# Patient Record
Sex: MALE | Race: OTHER | HISPANIC OR LATINO | Employment: OTHER | URBAN - METROPOLITAN AREA
[De-identification: names, ages, dates, MRNs, and addresses within clinical notes are randomized per-mention and may not be internally consistent; named-entity substitution may affect disease eponyms.]

---

## 2017-08-14 ENCOUNTER — HOSPITAL ENCOUNTER (EMERGENCY)
Facility: HOSPITAL | Age: 37
Discharge: HOME | End: 2017-08-14
Attending: EMERGENCY MEDICINE | Admitting: EMERGENCY MEDICINE
Payer: MEDICARE

## 2017-08-14 VITALS
RESPIRATION RATE: 18 BRPM | SYSTOLIC BLOOD PRESSURE: 116 MMHG | HEIGHT: 71 IN | TEMPERATURE: 98.1 F | OXYGEN SATURATION: 96 % | BODY MASS INDEX: 39.62 KG/M2 | DIASTOLIC BLOOD PRESSURE: 74 MMHG | WEIGHT: 283 LBS | HEART RATE: 71 BPM

## 2017-08-14 DIAGNOSIS — L70.8 ACNEIFORM ERUPTION: Primary | ICD-10-CM

## 2017-08-14 PROCEDURE — 99283 EMERGENCY DEPT VISIT LOW MDM: CPT

## 2017-08-14 RX ORDER — DOXYCYCLINE HYCLATE 100 MG/1
100 CAPSULE ORAL 2 TIMES DAILY
Qty: 20 CAPSULE | Refills: 0 | Status: SHIPPED | OUTPATIENT
Start: 2017-08-14 | End: 2017-08-24

## 2017-08-23 ENCOUNTER — APPOINTMENT (OUTPATIENT)
Dept: LAB | Facility: CLINIC | Age: 37
End: 2017-08-23
Payer: MEDICARE

## 2017-08-23 ENCOUNTER — ALLSCRIPTS OFFICE VISIT (OUTPATIENT)
Dept: OTHER | Facility: OTHER | Age: 37
End: 2017-08-23

## 2017-08-23 DIAGNOSIS — I82.492 ACUTE EMBOLISM AND THROMBOSIS OF OTHER SPECIFIED DEEP VEIN OF LEFT LOWER EXTREMITY (HCC): ICD-10-CM

## 2017-08-23 DIAGNOSIS — E66.01 MORBID (SEVERE) OBESITY DUE TO EXCESS CALORIES (HCC): ICD-10-CM

## 2017-08-23 DIAGNOSIS — R21 RASH AND OTHER NONSPECIFIC SKIN ERUPTION: ICD-10-CM

## 2017-08-23 DIAGNOSIS — R63.1 POLYDIPSIA: ICD-10-CM

## 2017-08-23 DIAGNOSIS — M25.572 PAIN IN LEFT ANKLE: ICD-10-CM

## 2017-08-23 DIAGNOSIS — L81.8 OTHER SPECIFIED DISORDERS OF PIGMENTATION: ICD-10-CM

## 2017-08-23 DIAGNOSIS — F41.8 OTHER SPECIFIED ANXIETY DISORDERS: ICD-10-CM

## 2017-08-23 DIAGNOSIS — J45.20 MILD INTERMITTENT ASTHMA, UNCOMPLICATED: ICD-10-CM

## 2017-08-23 DIAGNOSIS — R35.0 FREQUENCY OF MICTURITION: ICD-10-CM

## 2017-08-23 LAB
25(OH)D3 SERPL-MCNC: 15.1 NG/ML (ref 30–100)
ALBUMIN SERPL BCP-MCNC: 3.8 G/DL (ref 3.5–5)
ALP SERPL-CCNC: 85 U/L (ref 46–116)
ALT SERPL W P-5'-P-CCNC: 40 U/L (ref 12–78)
ANION GAP SERPL CALCULATED.3IONS-SCNC: 5 MMOL/L (ref 4–13)
APTT PPP: 30 SECONDS (ref 23–35)
AST SERPL W P-5'-P-CCNC: 20 U/L (ref 5–45)
BACTERIA UR QL AUTO: ABNORMAL /HPF
BASOPHILS # BLD AUTO: 0.05 THOUSANDS/ΜL (ref 0–0.1)
BASOPHILS NFR BLD AUTO: 1 % (ref 0–1)
BILIRUB SERPL-MCNC: 0.31 MG/DL (ref 0.2–1)
BILIRUB UR QL STRIP: NEGATIVE
BUN SERPL-MCNC: 13 MG/DL (ref 5–25)
CALCIUM SERPL-MCNC: 9.2 MG/DL (ref 8.3–10.1)
CAOX CRY URNS QL MICRO: ABNORMAL /HPF
CHLORIDE SERPL-SCNC: 106 MMOL/L (ref 100–108)
CHOLEST SERPL-MCNC: 147 MG/DL (ref 50–200)
CLARITY UR: CLEAR
CO2 SERPL-SCNC: 27 MMOL/L (ref 21–32)
COLOR UR: YELLOW
CREAT SERPL-MCNC: 0.95 MG/DL (ref 0.6–1.3)
EOSINOPHIL # BLD AUTO: 0.22 THOUSAND/ΜL (ref 0–0.61)
EOSINOPHIL NFR BLD AUTO: 3 % (ref 0–6)
ERYTHROCYTE [DISTWIDTH] IN BLOOD BY AUTOMATED COUNT: 14.3 % (ref 11.6–15.1)
EST. AVERAGE GLUCOSE BLD GHB EST-MCNC: 108 MG/DL
GFR SERPL CREATININE-BSD FRML MDRD: 102 ML/MIN/1.73SQ M
GLUCOSE P FAST SERPL-MCNC: 85 MG/DL (ref 65–99)
GLUCOSE UR STRIP-MCNC: NEGATIVE MG/DL
HBA1C MFR BLD: 5.4 % (ref 4.2–6.3)
HCT VFR BLD AUTO: 45.5 % (ref 36.5–49.3)
HDLC SERPL-MCNC: 39 MG/DL (ref 40–60)
HGB BLD-MCNC: 15.1 G/DL (ref 12–17)
HGB UR QL STRIP.AUTO: NEGATIVE
KETONES UR STRIP-MCNC: NEGATIVE MG/DL
LDLC SERPL CALC-MCNC: 78 MG/DL (ref 0–100)
LEUKOCYTE ESTERASE UR QL STRIP: NEGATIVE
LYMPHOCYTES # BLD AUTO: 1.69 THOUSANDS/ΜL (ref 0.6–4.47)
LYMPHOCYTES NFR BLD AUTO: 20 % (ref 14–44)
MCH RBC QN AUTO: 31.3 PG (ref 26.8–34.3)
MCHC RBC AUTO-ENTMCNC: 33.2 G/DL (ref 31.4–37.4)
MCV RBC AUTO: 94 FL (ref 82–98)
MONOCYTES # BLD AUTO: 0.9 THOUSAND/ΜL (ref 0.17–1.22)
MONOCYTES NFR BLD AUTO: 10 % (ref 4–12)
NEUTROPHILS # BLD AUTO: 5.78 THOUSANDS/ΜL (ref 1.85–7.62)
NEUTS SEG NFR BLD AUTO: 66 % (ref 43–75)
NITRITE UR QL STRIP: NEGATIVE
NON-SQ EPI CELLS URNS QL MICRO: ABNORMAL /HPF
NRBC BLD AUTO-RTO: 0 /100 WBCS
PH UR STRIP.AUTO: 6 [PH] (ref 4.5–8)
PLATELET # BLD AUTO: 302 THOUSANDS/UL (ref 149–390)
PMV BLD AUTO: 10.4 FL (ref 8.9–12.7)
POTASSIUM SERPL-SCNC: 4.1 MMOL/L (ref 3.5–5.3)
PROT SERPL-MCNC: 7.8 G/DL (ref 6.4–8.2)
PROT UR STRIP-MCNC: ABNORMAL MG/DL
RBC # BLD AUTO: 4.83 MILLION/UL (ref 3.88–5.62)
RBC #/AREA URNS AUTO: ABNORMAL /HPF
SODIUM SERPL-SCNC: 138 MMOL/L (ref 136–145)
SP GR UR STRIP.AUTO: 1.03 (ref 1–1.03)
TRIGL SERPL-MCNC: 148 MG/DL
TSH SERPL DL<=0.05 MIU/L-ACNC: 1.12 UIU/ML (ref 0.36–3.74)
UROBILINOGEN UR QL STRIP.AUTO: 0.2 E.U./DL
WBC # BLD AUTO: 8.67 THOUSAND/UL (ref 4.31–10.16)
WBC #/AREA URNS AUTO: ABNORMAL /HPF

## 2017-08-23 PROCEDURE — 85025 COMPLETE CBC W/AUTO DIFF WBC: CPT

## 2017-08-23 PROCEDURE — 86003 ALLG SPEC IGE CRUDE XTRC EA: CPT

## 2017-08-23 PROCEDURE — 82785 ASSAY OF IGE: CPT

## 2017-08-23 PROCEDURE — 80061 LIPID PANEL: CPT

## 2017-08-23 PROCEDURE — 82306 VITAMIN D 25 HYDROXY: CPT

## 2017-08-23 PROCEDURE — 83036 HEMOGLOBIN GLYCOSYLATED A1C: CPT

## 2017-08-23 PROCEDURE — 80053 COMPREHEN METABOLIC PANEL: CPT

## 2017-08-23 PROCEDURE — 81001 URINALYSIS AUTO W/SCOPE: CPT

## 2017-08-23 PROCEDURE — 84443 ASSAY THYROID STIM HORMONE: CPT

## 2017-08-23 PROCEDURE — 85730 THROMBOPLASTIN TIME PARTIAL: CPT

## 2017-08-23 PROCEDURE — 36415 COLL VENOUS BLD VENIPUNCTURE: CPT

## 2017-08-23 PROCEDURE — 86146 BETA-2 GLYCOPROTEIN ANTIBODY: CPT

## 2017-08-24 LAB
A ALTERNATA IGE QN: <0.1 KUA/I
A FUMIGATUS IGE QN: <0.1 KUA/I
ALLERGEN COMMENT: ABNORMAL
BERMUDA GRASS IGE QN: <0.1 KUA/I
BOXELDER IGE QN: 0.2 KUA/I
C HERBARUM IGE QN: <0.1 KUA/I
CAT DANDER IGE QN: <0.1 KUA/I
CMN PIGWEED IGE QN: <0.1 KUA/I
COMMON RAGWEED IGE QN: <0.1 KUA/I
COTTONWOOD IGE QN: <0.1 KUA/I
D FARINAE IGE QN: <0.1 KUA/I
D PTERONYSS IGE QN: <0.1 KUA/I
DOG DANDER IGE QN: <0.1 KUA/I
LONDON PLANE IGE QN: 0.1 KUA/I
MOUSE URINE PROT IGE QN: <0.1 KUA/I
MT JUNIPER IGE QN: <0.1 KUA/I
MUGWORT IGE QN: <0.1 KUA/I
P NOTATUM IGE QN: <0.1 KUA/I
ROACH IGE QN: 0.17 KUA/I
SHEEP SORREL IGE QN: <0.1 KUA/I
SILVER BIRCH IGE QN: <0.1 KUA/I
TIMOTHY IGE QN: <0.1 KUA/I
TOTAL IGE SMQN RAST: 146 KU/L (ref 0–113)
WALNUT IGE QN: 0.16 KUA/I
WHITE ASH IGE QN: 0.16 KUA/I
WHITE ELM IGE QN: 0.22 KUA/I
WHITE MULBERRY IGE QN: <0.1 KUA/I
WHITE OAK IGE QN: <0.1 KUA/I

## 2017-08-25 ENCOUNTER — ALLSCRIPTS OFFICE VISIT (OUTPATIENT)
Dept: OTHER | Facility: OTHER | Age: 37
End: 2017-08-25

## 2017-08-25 LAB
B2 GLYCOPROT1 IGA SER-ACNC: <9 GPI IGA UNITS (ref 0–25)
B2 GLYCOPROT1 IGG SER-ACNC: <9 GPI IGG UNITS (ref 0–20)
B2 GLYCOPROT1 IGM SER-ACNC: <9 GPI IGM UNITS (ref 0–32)

## 2017-09-15 ENCOUNTER — HOSPITAL ENCOUNTER (EMERGENCY)
Facility: HOSPITAL | Age: 37
Discharge: HOME | End: 2017-09-15
Attending: EMERGENCY MEDICINE | Admitting: EMERGENCY MEDICINE
Payer: MEDICARE

## 2017-09-15 ENCOUNTER — APPOINTMENT (EMERGENCY)
Dept: RADIOLOGY | Facility: HOSPITAL | Age: 37
End: 2017-09-15
Payer: MEDICARE

## 2017-09-15 VITALS
TEMPERATURE: 98.7 F | HEART RATE: 52 BPM | RESPIRATION RATE: 18 BRPM | WEIGHT: 243 LBS | DIASTOLIC BLOOD PRESSURE: 71 MMHG | OXYGEN SATURATION: 100 % | SYSTOLIC BLOOD PRESSURE: 118 MMHG | BODY MASS INDEX: 33.89 KG/M2

## 2017-09-15 DIAGNOSIS — N20.1 URETERAL STONE: ICD-10-CM

## 2017-09-15 DIAGNOSIS — R31.9 HEMATURIA: ICD-10-CM

## 2017-09-15 DIAGNOSIS — N39.0 UTI (URINARY TRACT INFECTION): ICD-10-CM

## 2017-09-15 DIAGNOSIS — R10.9 FLANK PAIN: Primary | ICD-10-CM

## 2017-09-15 LAB
ANION GAP SERPL CALCULATED.3IONS-SCNC: 7 MMOL/L (ref 4–13)
BACTERIA UR QL AUTO: ABNORMAL /HPF
BASOPHILS # BLD AUTO: 0.05 THOUSANDS/ΜL (ref 0–0.1)
BASOPHILS NFR BLD AUTO: 1 % (ref 0–1)
BILIRUB UR QL STRIP: ABNORMAL
BUN SERPL-MCNC: 12 MG/DL (ref 5–25)
CALCIUM SERPL-MCNC: 9.7 MG/DL (ref 8.3–10.1)
CHLORIDE SERPL-SCNC: 106 MMOL/L (ref 100–108)
CLARITY UR: CLEAR
CLARITY, POC: CLEAR
CO2 SERPL-SCNC: 26 MMOL/L (ref 21–32)
COLOR UR: YELLOW
COLOR, POC: YELLOW
CREAT SERPL-MCNC: 0.97 MG/DL (ref 0.6–1.3)
EOSINOPHIL # BLD AUTO: 0.11 THOUSAND/ΜL (ref 0–0.61)
EOSINOPHIL NFR BLD AUTO: 1 % (ref 0–6)
ERYTHROCYTE [DISTWIDTH] IN BLOOD BY AUTOMATED COUNT: 14.4 % (ref 11.6–15.1)
GFR SERPL CREATININE-BSD FRML MDRD: 99 ML/MIN/1.73SQ M
GLUCOSE SERPL-MCNC: 86 MG/DL (ref 65–140)
GLUCOSE UR STRIP-MCNC: NEGATIVE MG/DL
HCT VFR BLD AUTO: 47 % (ref 36.5–49.3)
HGB BLD-MCNC: 15.8 G/DL (ref 12–17)
HGB UR QL STRIP.AUTO: ABNORMAL
HYALINE CASTS #/AREA URNS LPF: ABNORMAL /LPF
KETONES UR STRIP-MCNC: NEGATIVE MG/DL
LEUKOCYTE ESTERASE UR QL STRIP: NEGATIVE
LYMPHOCYTES # BLD AUTO: 1.74 THOUSANDS/ΜL (ref 0.6–4.47)
LYMPHOCYTES NFR BLD AUTO: 21 % (ref 14–44)
MCH RBC QN AUTO: 31.3 PG (ref 26.8–34.3)
MCHC RBC AUTO-ENTMCNC: 33.6 G/DL (ref 31.4–37.4)
MCV RBC AUTO: 93 FL (ref 82–98)
MONOCYTES # BLD AUTO: 0.61 THOUSAND/ΜL (ref 0.17–1.22)
MONOCYTES NFR BLD AUTO: 8 % (ref 4–12)
NEUTROPHILS # BLD AUTO: 5.61 THOUSANDS/ΜL (ref 1.85–7.62)
NEUTS SEG NFR BLD AUTO: 69 % (ref 43–75)
NITRITE UR QL STRIP: NEGATIVE
NON-SQ EPI CELLS URNS QL MICRO: ABNORMAL /HPF
NRBC BLD AUTO-RTO: 0 /100 WBCS
PH UR STRIP.AUTO: 6 [PH] (ref 4.5–8)
PLATELET # BLD AUTO: 315 THOUSANDS/UL (ref 149–390)
PMV BLD AUTO: 10.4 FL (ref 8.9–12.7)
POTASSIUM SERPL-SCNC: 3.9 MMOL/L (ref 3.5–5.3)
PROT UR STRIP-MCNC: NEGATIVE MG/DL
RBC # BLD AUTO: 5.04 MILLION/UL (ref 3.88–5.62)
RBC #/AREA URNS AUTO: ABNORMAL /HPF
SODIUM SERPL-SCNC: 139 MMOL/L (ref 136–145)
SP GR UR STRIP.AUTO: >=1.03 (ref 1–1.03)
UROBILINOGEN UR QL STRIP.AUTO: 0.2 E.U./DL
WBC # BLD AUTO: 8.15 THOUSAND/UL (ref 4.31–10.16)
WBC #/AREA URNS AUTO: ABNORMAL /HPF

## 2017-09-15 PROCEDURE — 96375 TX/PRO/DX INJ NEW DRUG ADDON: CPT

## 2017-09-15 PROCEDURE — 85025 COMPLETE CBC W/AUTO DIFF WBC: CPT | Performed by: EMERGENCY MEDICINE

## 2017-09-15 PROCEDURE — 80048 BASIC METABOLIC PNL TOTAL CA: CPT | Performed by: EMERGENCY MEDICINE

## 2017-09-15 PROCEDURE — 74176 CT ABD & PELVIS W/O CONTRAST: CPT

## 2017-09-15 PROCEDURE — 36415 COLL VENOUS BLD VENIPUNCTURE: CPT | Performed by: EMERGENCY MEDICINE

## 2017-09-15 PROCEDURE — 81002 URINALYSIS NONAUTO W/O SCOPE: CPT | Performed by: EMERGENCY MEDICINE

## 2017-09-15 PROCEDURE — 99284 EMERGENCY DEPT VISIT MOD MDM: CPT

## 2017-09-15 PROCEDURE — 81001 URINALYSIS AUTO W/SCOPE: CPT

## 2017-09-15 PROCEDURE — 96374 THER/PROPH/DIAG INJ IV PUSH: CPT

## 2017-09-15 PROCEDURE — 96361 HYDRATE IV INFUSION ADD-ON: CPT

## 2017-09-15 RX ORDER — QUETIAPINE FUMARATE 50 MG/1
50 TABLET, FILM COATED ORAL
COMMUNITY
End: 2021-09-14 | Stop reason: HOSPADM

## 2017-09-15 RX ORDER — ONDANSETRON 2 MG/ML
4 INJECTION INTRAMUSCULAR; INTRAVENOUS ONCE
Status: COMPLETED | OUTPATIENT
Start: 2017-09-15 | End: 2017-09-15

## 2017-09-15 RX ORDER — ASPIRIN 81 MG/1
81 TABLET, CHEWABLE ORAL DAILY
COMMUNITY
End: 2021-09-09

## 2017-09-15 RX ORDER — KETOROLAC TROMETHAMINE 30 MG/ML
15 INJECTION, SOLUTION INTRAMUSCULAR; INTRAVENOUS ONCE
Status: COMPLETED | OUTPATIENT
Start: 2017-09-15 | End: 2017-09-15

## 2017-09-15 RX ORDER — NITROFURANTOIN 25; 75 MG/1; MG/1
100 CAPSULE ORAL 2 TIMES DAILY
Qty: 10 CAPSULE | Refills: 0 | Status: SHIPPED | OUTPATIENT
Start: 2017-09-15 | End: 2017-09-20

## 2017-09-15 RX ADMIN — KETOROLAC TROMETHAMINE 15 MG: 30 INJECTION, SOLUTION INTRAMUSCULAR at 15:00

## 2017-09-15 RX ADMIN — SODIUM CHLORIDE 1000 ML: 0.9 INJECTION, SOLUTION INTRAVENOUS at 15:00

## 2017-09-15 RX ADMIN — ONDANSETRON 4 MG: 2 INJECTION INTRAMUSCULAR; INTRAVENOUS at 15:00

## 2017-09-18 ENCOUNTER — GENERIC CONVERSION - ENCOUNTER (OUTPATIENT)
Dept: OTHER | Facility: OTHER | Age: 37
End: 2017-09-18

## 2018-01-12 VITALS
DIASTOLIC BLOOD PRESSURE: 84 MMHG | TEMPERATURE: 97.8 F | HEART RATE: 92 BPM | SYSTOLIC BLOOD PRESSURE: 118 MMHG | BODY MASS INDEX: 35.99 KG/M2 | WEIGHT: 257.06 LBS | HEIGHT: 71 IN

## 2018-01-13 VITALS
WEIGHT: 254.41 LBS | HEART RATE: 88 BPM | TEMPERATURE: 98 F | SYSTOLIC BLOOD PRESSURE: 112 MMHG | DIASTOLIC BLOOD PRESSURE: 86 MMHG | HEIGHT: 71 IN | BODY MASS INDEX: 35.62 KG/M2

## 2018-01-22 VITALS
DIASTOLIC BLOOD PRESSURE: 80 MMHG | HEIGHT: 71 IN | WEIGHT: 255.29 LBS | BODY MASS INDEX: 35.74 KG/M2 | HEART RATE: 98 BPM | SYSTOLIC BLOOD PRESSURE: 112 MMHG | TEMPERATURE: 97.8 F

## 2021-09-09 ENCOUNTER — HOSPITAL ENCOUNTER (INPATIENT)
Facility: HOSPITAL | Age: 41
LOS: 5 days | Discharge: HOME | DRG: 750 | End: 2021-09-14
Attending: EMERGENCY MEDICINE | Admitting: STUDENT IN AN ORGANIZED HEALTH CARE EDUCATION/TRAINING PROGRAM
Payer: COMMERCIAL

## 2021-09-09 ENCOUNTER — APPOINTMENT (INPATIENT)
Dept: RADIOLOGY | Facility: HOSPITAL | Age: 41
DRG: 750 | End: 2021-09-09
Payer: COMMERCIAL

## 2021-09-09 DIAGNOSIS — F25.9 SCHIZOAFFECTIVE DISORDER, UNSPECIFIED TYPE (HCC): ICD-10-CM

## 2021-09-09 DIAGNOSIS — G40.909 SEIZURE DISORDER (HCC): ICD-10-CM

## 2021-09-09 DIAGNOSIS — Z00.8 MEDICAL CLEARANCE FOR PSYCHIATRIC ADMISSION: ICD-10-CM

## 2021-09-09 DIAGNOSIS — F41.9 ANXIETY: ICD-10-CM

## 2021-09-09 DIAGNOSIS — Z86.718 HISTORY OF DVT IN ADULTHOOD: ICD-10-CM

## 2021-09-09 DIAGNOSIS — R45.851 SUICIDAL IDEATION: Primary | ICD-10-CM

## 2021-09-09 PROBLEM — F17.210 TOBACCO DEPENDENCE DUE TO CIGARETTES: Status: ACTIVE | Noted: 2021-09-09

## 2021-09-09 PROBLEM — Z87.898 HISTORY OF SEIZURE: Status: ACTIVE | Noted: 2021-09-09

## 2021-09-09 PROBLEM — F39 MOOD DISORDER (HCC): Status: ACTIVE | Noted: 2021-09-09

## 2021-09-09 LAB
AMPHETAMINES SERPL QL SCN: NEGATIVE
BARBITURATES UR QL: NEGATIVE
BENZODIAZ UR QL: NEGATIVE
COCAINE UR QL: POSITIVE
ETHANOL EXG-MCNC: 0 MG/DL
METHADONE UR QL: NEGATIVE
OPIATES UR QL SCN: NEGATIVE
OXYCODONE+OXYMORPHONE UR QL SCN: NEGATIVE
PCP UR QL: NEGATIVE
SARS-COV-2 RNA RESP QL NAA+PROBE: NEGATIVE
THC UR QL: POSITIVE

## 2021-09-09 PROCEDURE — 99285 EMERGENCY DEPT VISIT HI MDM: CPT | Performed by: PHYSICIAN ASSISTANT

## 2021-09-09 PROCEDURE — 82075 ASSAY OF BREATH ETHANOL: CPT | Performed by: PHYSICIAN ASSISTANT

## 2021-09-09 PROCEDURE — 99285 EMERGENCY DEPT VISIT HI MDM: CPT

## 2021-09-09 PROCEDURE — 99254 IP/OBS CNSLTJ NEW/EST MOD 60: CPT | Performed by: STUDENT IN AN ORGANIZED HEALTH CARE EDUCATION/TRAINING PROGRAM

## 2021-09-09 PROCEDURE — U0003 INFECTIOUS AGENT DETECTION BY NUCLEIC ACID (DNA OR RNA); SEVERE ACUTE RESPIRATORY SYNDROME CORONAVIRUS 2 (SARS-COV-2) (CORONAVIRUS DISEASE [COVID-19]), AMPLIFIED PROBE TECHNIQUE, MAKING USE OF HIGH THROUGHPUT TECHNOLOGIES AS DESCRIBED BY CMS-2020-01-R: HCPCS | Performed by: PHYSICIAN ASSISTANT

## 2021-09-09 PROCEDURE — 80307 DRUG TEST PRSMV CHEM ANLYZR: CPT | Performed by: PHYSICIAN ASSISTANT

## 2021-09-09 PROCEDURE — 99252 IP/OBS CONSLTJ NEW/EST SF 35: CPT | Performed by: PHYSICIAN ASSISTANT

## 2021-09-09 PROCEDURE — U0005 INFEC AGEN DETEC AMPLI PROBE: HCPCS | Performed by: PHYSICIAN ASSISTANT

## 2021-09-09 RX ORDER — BENZTROPINE MESYLATE 1 MG/1
1 TABLET ORAL
Status: DISCONTINUED | OUTPATIENT
Start: 2021-09-09 | End: 2021-09-14 | Stop reason: HOSPADM

## 2021-09-09 RX ORDER — PROPRANOLOL HYDROCHLORIDE 10 MG/1
10 TABLET ORAL EVERY 8 HOURS PRN
Status: DISCONTINUED | OUTPATIENT
Start: 2021-09-09 | End: 2021-09-14 | Stop reason: HOSPADM

## 2021-09-09 RX ORDER — LORAZEPAM 1 MG/1
2 TABLET ORAL ONCE
Status: COMPLETED | OUTPATIENT
Start: 2021-09-09 | End: 2021-09-09

## 2021-09-09 RX ORDER — CLONAZEPAM 0.5 MG/1
0.5 TABLET ORAL 2 TIMES DAILY
Status: DISCONTINUED | OUTPATIENT
Start: 2021-09-09 | End: 2021-09-09

## 2021-09-09 RX ORDER — DIVALPROEX SODIUM 250 MG/1
250 TABLET, EXTENDED RELEASE ORAL DAILY
COMMUNITY
End: 2021-09-09

## 2021-09-09 RX ORDER — ACETAMINOPHEN 325 MG/1
650 TABLET ORAL EVERY 6 HOURS PRN
Status: DISCONTINUED | OUTPATIENT
Start: 2021-09-09 | End: 2021-09-14 | Stop reason: HOSPADM

## 2021-09-09 RX ORDER — ACETAMINOPHEN 325 MG/1
975 TABLET ORAL EVERY 8 HOURS PRN
Status: DISCONTINUED | OUTPATIENT
Start: 2021-09-09 | End: 2021-09-14 | Stop reason: HOSPADM

## 2021-09-09 RX ORDER — CLONAZEPAM 0.5 MG/1
0.5 TABLET ORAL DAILY
COMMUNITY
End: 2021-09-14 | Stop reason: HOSPADM

## 2021-09-09 RX ORDER — DIVALPROEX SODIUM 500 MG/1
200 TABLET, EXTENDED RELEASE ORAL DAILY
Status: ON HOLD | COMMUNITY
End: 2021-09-14 | Stop reason: SDUPTHER

## 2021-09-09 RX ORDER — OLANZAPINE 10 MG/1
10 TABLET ORAL
Status: DISCONTINUED | OUTPATIENT
Start: 2021-09-09 | End: 2021-09-14 | Stop reason: HOSPADM

## 2021-09-09 RX ORDER — OLANZAPINE 2.5 MG/1
2.5 TABLET ORAL
Status: DISCONTINUED | OUTPATIENT
Start: 2021-09-09 | End: 2021-09-14 | Stop reason: HOSPADM

## 2021-09-09 RX ORDER — OLANZAPINE 5 MG/1
5 TABLET ORAL
Status: DISCONTINUED | OUTPATIENT
Start: 2021-09-09 | End: 2021-09-14 | Stop reason: HOSPADM

## 2021-09-09 RX ORDER — OLANZAPINE 10 MG/1
10 INJECTION, POWDER, LYOPHILIZED, FOR SOLUTION INTRAMUSCULAR
Status: DISCONTINUED | OUTPATIENT
Start: 2021-09-09 | End: 2021-09-14 | Stop reason: HOSPADM

## 2021-09-09 RX ORDER — DIVALPROEX SODIUM 500 MG/1
500 TABLET, EXTENDED RELEASE ORAL DAILY
Status: DISCONTINUED | OUTPATIENT
Start: 2021-09-09 | End: 2021-09-10

## 2021-09-09 RX ORDER — ALBUTEROL SULFATE 90 UG/1
2 AEROSOL, METERED RESPIRATORY (INHALATION) EVERY 4 HOURS PRN
Status: DISCONTINUED | OUTPATIENT
Start: 2021-09-09 | End: 2021-09-14 | Stop reason: HOSPADM

## 2021-09-09 RX ORDER — QUETIAPINE FUMARATE 50 MG/1
50 TABLET, FILM COATED ORAL 2 TIMES DAILY
Status: DISCONTINUED | OUTPATIENT
Start: 2021-09-09 | End: 2021-09-09

## 2021-09-09 RX ORDER — BENZTROPINE MESYLATE 1 MG/ML
1 INJECTION INTRAMUSCULAR; INTRAVENOUS
Status: DISCONTINUED | OUTPATIENT
Start: 2021-09-09 | End: 2021-09-14 | Stop reason: HOSPADM

## 2021-09-09 RX ORDER — QUETIAPINE FUMARATE 25 MG/1
25 TABLET, FILM COATED ORAL DAILY
COMMUNITY
End: 2021-09-14 | Stop reason: HOSPADM

## 2021-09-09 RX ORDER — OLANZAPINE 10 MG/1
5 INJECTION, POWDER, LYOPHILIZED, FOR SOLUTION INTRAMUSCULAR
Status: DISCONTINUED | OUTPATIENT
Start: 2021-09-09 | End: 2021-09-14 | Stop reason: HOSPADM

## 2021-09-09 RX ADMIN — RIVAROXABAN 20 MG: 20 TABLET, FILM COATED ORAL at 15:38

## 2021-09-09 RX ADMIN — DIVALPROEX SODIUM 500 MG: 500 TABLET, EXTENDED RELEASE ORAL at 15:38

## 2021-09-09 RX ADMIN — CLONAZEPAM 0.5 MG: 0.5 TABLET ORAL at 15:38

## 2021-09-09 RX ADMIN — LORAZEPAM 2 MG: 0.5 TABLET ORAL at 04:53

## 2021-09-09 NOTE — ED NOTES
Reviewed home med list with patient   Pt reports only taking medication for seizures (depakote) and Xarelto (Hx of leg DVTs) yesterday; all other medications have not been taken in "some time "      Jaci Chavira RN  09/09/21 0190

## 2021-09-09 NOTE — ED PROVIDER NOTES
History  Chief Complaint   Patient presents with    Psychiatric Evaluation     Patient presents for an evaluation of SI x1 day  Patient had a plan to hang himself and was apparently in the process of doing so when his uncle stopped him and drove patient to the hospital  States he has become more depressed because his wife is with his brother and he cannot see his children  Denies any HI, AH/ VH  He is unsure of his psychiatric diagnosis and cannot remember what medications he should be on (per chart review 50mg Seroquel, Valium, ASA)  No other complaints  Prior to Admission Medications   Prescriptions Last Dose Informant Patient Reported? Taking? QUEtiapine (SEROquel) 25 mg tablet Unknown at Unknown time Self Yes No   Sig: Take 25 mg by mouth daily Takes daily in AM   QUEtiapine (SEROquel) 50 mg tablet Unknown at Unknown time  Yes No   Sig: Take 50 mg by mouth daily at bedtime Takes daily at HS: in addition to AM dose   clonazePAM (KlonoPIN) 0 5 mg tablet Unknown at Unknown time Self Yes No   Sig: Take 0 5 mg by mouth daily   divalproex sodium (Depakote ER) 500 mg 24 hr tablet 9/8/2021 at Unknown time Self Yes Yes   Sig: Take 200 mg by mouth daily   rivaroxaban (Xarelto) 20 mg tablet 9/8/2021 at Unknown time Self Yes Yes   Sig: Take 20 mg by mouth      Facility-Administered Medications: None       Past Medical History:   Diagnosis Date    Depression     DVT, lower extremity ([facility] [address] )     Psychiatric disorder        History reviewed  No pertinent surgical history  History reviewed  No pertinent family history  I have reviewed and agree with the history as documented      E-Cigarette/Vaping    E-Cigarette Use Never User      E-Cigarette/Vaping Substances     Social History     Tobacco Use    Smoking status: Current Every Day Smoker     Packs/day: 1 50    Smokeless tobacco: Never Used   Vaping Use    Vaping Use: Never used   Substance Use Topics    Alcohol use: No    Drug use: Not Currently Types: Marijuana       Review of Systems   Constitutional: Negative for chills and fever  HENT: Negative for congestion, ear pain and sore throat  Eyes: Negative for pain  Respiratory: Negative for cough and shortness of breath  Cardiovascular: Negative for chest pain  Gastrointestinal: Negative for abdominal pain, nausea and vomiting  Genitourinary: Negative for dysuria  Musculoskeletal: Negative for back pain  Skin: Negative for rash  Neurological: Negative for dizziness, weakness and numbness  Psychiatric/Behavioral: Positive for dysphoric mood and suicidal ideas  Negative for agitation, behavioral problems, confusion, decreased concentration and self-injury  The patient is not nervous/anxious  All other systems reviewed and are negative  Physical Exam  Physical Exam  Vitals reviewed  Constitutional:       General: He is not in acute distress  Appearance: He is well-developed  He is not diaphoretic  HENT:      Head: Normocephalic and atraumatic  Right Ear: External ear normal       Left Ear: External ear normal       Nose: Nose normal    Eyes:      Pupils: Pupils are equal, round, and reactive to light  Cardiovascular:      Rate and Rhythm: Normal rate and regular rhythm  Heart sounds: Normal heart sounds  Pulmonary:      Effort: Pulmonary effort is normal       Breath sounds: Normal breath sounds  Abdominal:      General: Bowel sounds are normal       Palpations: Abdomen is soft  Tenderness: There is no abdominal tenderness  Musculoskeletal:         General: Normal range of motion  Cervical back: Normal range of motion and neck supple  Skin:     General: Skin is warm and dry  Neurological:      Mental Status: He is alert and oriented to person, place, and time  Psychiatric:         Attention and Perception: Attention and perception normal          Mood and Affect: Mood is depressed  Affect is not angry or inappropriate           Speech: Speech normal          Behavior: Behavior normal  Behavior is cooperative  Thought Content: Thought content includes suicidal ideation  Thought content does not include homicidal ideation  Thought content includes suicidal plan  Thought content does not include homicidal plan  Vital Signs  ED Triage Vitals   Temperature Pulse Respirations Blood Pressure SpO2   09/09/21 0347 09/09/21 0347 09/09/21 0347 09/09/21 0347 09/09/21 0347   97 9 °F (36 6 °C) 95 18 126/82 98 %      Temp Source Heart Rate Source Patient Position - Orthostatic VS BP Location FiO2 (%)   09/09/21 0347 09/09/21 0347 09/09/21 0345 09/09/21 0347 --   Tympanic Monitor Lying Left arm       Pain Score       --                  Vitals:    09/09/21 0345 09/09/21 0347   BP:  126/82   Pulse:  95   Patient Position - Orthostatic VS: Lying Sitting         Visual Acuity      ED Medications  Medications   LORazepam (ATIVAN) tablet 2 mg (2 mg Oral Given 9/9/21 0453)       Diagnostic Studies  Results Reviewed     Procedure Component Value Units Date/Time    Novel Coronavirus (Covid-19),PCR [facility] - 2 hour stat [[id number]]  (Normal) Collected: 09/09/21 0406    Lab Status: Final result Specimen: Nares from Nasopharyngeal Swab Updated: 09/09/21 0510     SARS-CoV-2 Negative    Narrative: The specimen collection materials, transport medium, and/or testing methodology utilized in the production of these test results have been proven to be reliable in a limited validation with an abbreviated program under the Emergency Utilization Authorization provided by the FDA  Testing reported as "Presumptive positive" will be confirmed with secondary testing to ensure result accuracy  Clinical caution and judgement should be used with the interpretation of these results with consideration of the clinical impression and other laboratory testing    Testing reported as "Positive" or "Negative" has been proven to be accurate according to standard laboratory validation requirements  All testing is performed with control materials showing appropriate reactivity at standard intervals  Rapid drug screen, urine [[id number]]  (Abnormal) Collected: 09/09/21 0410    Lab Status: Final result Specimen: Urine, Clean Catch Updated: 09/09/21 0437     Amph/Meth UR Negative     Barbiturate Ur Negative     Benzodiazepine Urine Negative     Cocaine Urine Positive     Methadone Urine Negative     Opiate Urine Negative     PCP Ur Negative     THC Urine Positive     Oxycodone Urine Negative    Narrative:      Presumptive report  If requested, specimen will be sent to reference lab for confirmation  FOR MEDICAL PURPOSES ONLY  IF CONFIRMATION NEEDED PLEASE CONTACT THE LAB WITHIN 5 DAYS  Drug Screen Cutoff Levels:  AMPHETAMINE/METHAMPHETAMINES  1000 ng/mL  BARBITURATES     200 ng/mL  BENZODIAZEPINES     200 ng/mL  COCAINE      300 ng/mL  METHADONE      300 ng/mL  OPIATES      300 ng/mL  PHENCYCLIDINE     25 ng/mL  THC       50 ng/mL  OXYCODONE      100 ng/mL    POCT alcohol breath test [[id number]]  (Normal) Resulted: 09/09/21 0408    Lab Status: Final result Updated: 09/09/21 0408     EXTBreath Alcohol 0 00                 No orders to display              Procedures  Procedures         ED Course                             SBIRT 20yo+      Most Recent Value   SBIRT (23 yo +)   In order to provide better care to our patients, we are screening all of our patients for alcohol and drug use  Would it be okay to ask you these screening questions? Yes Filed at: 09/09/2021 0345   Initial Alcohol Screen: US AUDIT-C    1  How often do you have a drink containing alcohol? 1 Filed at: 09/09/2021 0345   2  How many drinks containing alcohol do you have on a typical day you are drinking? 1 Filed at: 09/09/2021 0345   3a  Male UNDER 65: How often do you have five or more drinks on one occasion? 1 Filed at: 09/09/2021 0345   3b  FEMALE Any Age, or MALE 65+:  How often do you have 4 or more drinks on one occassion? 0 Filed at: 09/09/2021 0345   Audit-C Score  3 Filed at: 09/09/2021 0345   JESSICA: How many times in the past year have you    Used an illegal drug or used a prescription medication for non-medical reasons? Once or Twice Filed at: 09/09/2021 0345   DAST-10: In the past 12 months      1  Have you used drugs other than those required for medical reasons? 0 Filed at: 09/09/2021 0345   2  Do you use more than one drug at a time? 0 Filed at: 09/09/2021 0345   3  Have you had medical problems as a result of your drug use (e g , memory loss, hepatitis, convulsions, bleeding, etc )? 0 Filed at: 09/09/2021 0345   4  Have you had "blackouts" or "flashbacks" as a result of drug use? YesNo  0 Filed at: 09/09/2021 0345   5  Do you ever feel bad or guilty about your drug use? 0 Filed at: 09/09/2021 0345   6  Does your spouse (or parent) ever complain about your involvement with drugs? 0 Filed at: 09/09/2021 0345   7  Have you neglected your family because of your use of drugs? 0 Filed at: 09/09/2021 0345   8  Have you engaged in illegal activities in order to obtain drugs? 0 Filed at: 09/09/2021 0345   9  Have you ever experienced withdrawal symptoms (felt sick) when you stopped taking drugs? 0 Filed at: 09/09/2021 0345   10  Are you always able to stop using drugs when you want to?  0 Filed at: 09/09/2021 0345   DAST-10 Score  0 Filed at: 09/09/2021 0345                    MDM    Disposition  Final diagnoses:   Suicidal ideation     Time reflects when diagnosis was documented in both MDM as applicable and the Disposition within this note     Time User Action Codes Description Comment    9/9/2021  5:40 AM Jd Parker Add [K77 058] Suicidal ideation       ED Disposition     None      Follow-up Information    None         Patient's Medications   Discharge Prescriptions    No medications on file     No discharge procedures on file      PDMP Review     None          ED Provider  Electronically Signed by           Micheal Jung PA-C  09/09/21 2102

## 2021-09-09 NOTE — ED NOTES
Pt resting in room, no signs of distress  1:1 monitoring in progress with sitter at door        Ofelia Moseley RN  09/09/21 2125

## 2021-09-09 NOTE — ED TRIAGE NOTES
Reports not taking his medication for the past 2-3 weeks and states since then has become increasingly depressed and now has suicidal ideations of hanging himself  He states no one would bring him to the hospital so he walked a mile here  He states his last suicide attempt was about a year and a half ago when he slit his arm

## 2021-09-09 NOTE — ED NOTES
Pt sleeping, unable to be assessed by crisis  Will defer behavioral assessment until pt awakens  1:1 continual monitoring in progress with ED tech at door        Laurent Blackburn RN  09/09/21 [address] [place], RN  09/09/21 2795
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
## 2021-09-09 NOTE — ED NOTES
Pt sleeping in room, no resp distress noted  1:1 continual observation in process with ER staff at door        Aida Cassidy, ARMANDO  09/09/21 2995

## 2021-09-09 NOTE — ED NOTES
[place] PROMISe indicates: Active  Recipient # [id number]  BH is not managed  [facility] recently managed by [employer]  Phone number: [phone]  Call placed to [employer]  Spoke with [facility] termed 8/31/2021

## 2021-09-09 NOTE — ED NOTES
Insurance   EVS (Eligibility Verification System) called - [phone]    Automated system indicates: active [place]    [employer]   [phone]

## 2021-09-09 NOTE — PLAN OF CARE
Problem: CARDIOVASCULAR - ADULT  Goal: Maintains optimal cardiac output and hemodynamic stability  Description: INTERVENTIONS:  - Monitor I/O, vital signs and rhythm  - Monitor for S/S and trends of decreased cardiac output  - Administer and titrate ordered vasoactive medications to optimize hemodynamic stability  - Assess quality of pulses, skin color and temperature  - Assess for signs of decreased coronary artery perfusion  - Instruct patient to report change in severity of symptoms  Outcome: Progressing     Problem: RESPIRATORY - ADULT  Goal: Achieves optimal ventilation and oxygenation  Description: INTERVENTIONS:  - Assess for changes in respiratory status  - Assess for changes in mentation and behavior  - Position to facilitate oxygenation and minimize respiratory effort  - Oxygen administered by appropriate delivery if ordered  - Initiate smoking cessation education as indicated  - Encourage broncho-pulmonary hygiene including cough, deep breathe, Incentive Spirometry  - Assess the need for suctioning and aspirate as needed  - Assess and instruct to report SOB or any respiratory difficulty  - Respiratory Therapy support as indicated  Outcome: Progressing

## 2021-09-09 NOTE — ED NOTES
Report given to RN on 3B, awaiting bed assignment  1:1 continual monitoring in place with ED tech at door        Yana Rodriguez RN  09/09/21 1714

## 2021-09-09 NOTE — ED NOTES
"HISTORY OF PRESENT ILLNESS:    Tess Keyes is a 44 Pt sleeping with no distress noted  1:1 continual observation with marciano at door        Elisa Seen, RN  09/09/21 3188 Vincent Paulino MD  Dept. Orthopedic Surgery  [facility]       Disclaimer: This note consists of symbols derived from keyboarding, dictation and/or voice recognition software. As a result, there may be errors in the script that have gone undetected. Please consider this when interpreting information found in this chart.

## 2021-09-09 NOTE — ED NOTES
Pt sleeping with no distress noted  1:1 continual observation in progress with tech at door        Lety Samayoa RN  09/09/21 7522

## 2021-09-09 NOTE — ED NOTES
Attempted to wake the patient for Crisis assessment  He acknowledged Crisis presence by saying hello, then fell back to sleep

## 2021-09-09 NOTE — PLAN OF CARE
Problem: SUBSTANCE USE/ABUSE  Goal: Will have no detox symptoms and will verbalize plan for changing substance-related behavior  Description: INTERVENTIONS:  - Monitor physical status and assess for symptoms of withdrawal  - Administer medication as ordered  - Provide emotional support with 1 on 1 interaction with staff  - Encourage recovery focused program/ addiction education  - Assess for verbalization of changing behaviors related to substance abuse  - Initiate consults and referrals as appropriate (Case Management, Spiritual Care, etc )  Outcome: Not Progressing  Goal: By discharge, will develop insight into their chemical dependency and sustain motivation to continue in recovery  Description: INTERVENTIONS:  - Attends all daily group sessions and scheduled AA groups  - Actively practices coping skills through participation in the therapeutic community and adherence to program rules  - Reviews and completes assignments from individual treatment plan  - Assist patient development of understanding of their personal cycle of addiction and relapse triggers  Outcome: Not Progressing     Problem: DISCHARGE PLANNING  Goal: Discharge to home or other facility with appropriate resources  Description: INTERVENTIONS:  - Identify barriers to discharge w/patient and caregiver  - Arrange for needed discharge resources and transportation as appropriate  - Identify discharge learning needs (meds, wound care, etc )  - Arrange for interpretive services to assist at discharge as needed  - Refer to Case Management Department for coordinating discharge planning if the patient needs post-hospital services based on physician/advanced practitioner order or complex needs related to functional status, cognitive ability, or social support system  Outcome: Not Progressing     Problem: METABOLIC, FLUID AND ELECTROLYTES - ADULT  Goal: Electrolytes maintained within normal limits  Description: INTERVENTIONS:  - Monitor labs and assess patient for signs and symptoms of electrolyte imbalances  - Administer electrolyte replacement as ordered  - Monitor response to electrolyte replacements, including repeat lab results as appropriate  - Instruct patient on fluid and nutrition as appropriate  Outcome: Not Progressing  Goal: Fluid balance maintained  Description: INTERVENTIONS:  - Monitor labs   - Monitor I/O and WT  - Instruct patient on fluid and nutrition as appropriate  - Assess for signs & symptoms of volume excess or deficit  Outcome: Not Progressing  Goal: Glucose maintained within target range  Description: INTERVENTIONS:  - Monitor Blood Glucose as ordered  - Assess for signs and symptoms of hyperglycemia and hypoglycemia  - Administer ordered medications to maintain glucose within target range  - Assess nutritional intake and initiate nutrition service referral as needed  Outcome: Not Progressing     Problem: Risk for Self Injury/Neglect  Goal: Treatment Goal: Remain safe during length of stay, learn and adopt new coping skills, and be free of self-injurious ideation, impulses and acts at the time of discharge  Outcome: Not Progressing  Goal: Verbalize thoughts and feelings  Description: Interventions:  - Assess and re-assess patient's lethality and potential for self-injury  - Engage patient in 1:1 interactions, daily, for a minimum of 15 minutes  - Encourage patient to express feelings, fears, frustrations, hopes  - Establish rapport/trust with patient   Outcome: Not Progressing  Goal: Refrain from harming self  Description: Interventions:  - Monitor patient closely, per order  - Develop a trusting relationship  - Supervise medication ingestion, monitor effects and side effects   Outcome: Not Progressing  Goal: Attend and participate in unit activities, including therapeutic, recreational, and educational groups  Description: Interventions:  - Provide therapeutic and educational activities daily, encourage attendance and participation, and document same in the medical record  - Obtain collateral information, encourage visitation and family involvement in care   Outcome: Not Progressing

## 2021-09-09 NOTE — ED NOTES
Pt sleeping with no distress, 1:1 continual observation at door        Ofelia Moseley, ARMANDO  09/09/21 0052

## 2021-09-09 NOTE — ED NOTES
Patient reports he was brought to the ED by his uncle after uncle observed the patient attempting to hang self  Patient presents as guarded with vague, inconsistent responses to questions  He stated he has been unable to see his children and his brother is now in a relationship with his wife  The patient reports a history of bipolar disorder "and a lot of things " EMR indicates a history of schizoaffective disorder, bipolar type, cocaine, PCP and marijuana use  There is a reported history of a seizure disorder and PE  Patient stated he has been off of his medication for several weeks  He has no current outpatient provider  In addition to depression and suicidal ideation, he reports poor sleep and appetite, poor energy,decreased motivAtion and anhedonia  Patient also reported the feeling like people are following him and talking negatively about him  He reported a previous history of suicide attempt by hanging but was unable to provide a time frame  According to the EMR, he has previously reported an overdose, stabbing his thigh with a screw  and cutting his wrist  He has also previously reported auditory hallucinations of a male voice telling him to come here  Patient stated he uses marijuana nearly daily  Reports cocaine use "here and there " There is a history of PCP use as well, per EMR  PAtient stated he has no supports at all and does not want to return to where he had been staying

## 2021-09-09 NOTE — ED CARE HANDOFF
Emergency Department Sign Out Note            ED Course / Workup Pending (followup): Based on evaluation patient is at risk for self-harm  Patient is willing to sign 201  Would need 302 commitment if unwilling to continue with 201  Diagnostics reviewed and patient is medically cleared for inpatient psychiatric admission   [facility] has been contacted and their evaluation is pending  Care of patient transferred to incoming team                                        Procedures  MDM    Disposition  Final diagnoses:   Suicidal ideation     Time reflects when diagnosis was documented in both MDM as applicable and the Disposition within this note     Time User Action Codes Description Comment    9/9/2021  5:40 AM Basilia Parker Add [Y51 [id number]] Suicidal ideation       ED Disposition     None      MD Documentation      Most Recent Value   Sending MD Thomas Aiken DO      Follow-up Information    None       Patient's Medications   Discharge Prescriptions    No medications on file     No discharge procedures on file         ED Provider  Electronically Signed by     Vida Newman DO  09/09/21 3884

## 2021-09-09 NOTE — ASSESSMENT & PLAN NOTE
· Patient originally presented to [facility] ED 09/09/2021 for evaluation of suicidal ideations  · Currently 201 status  · Management per Psychiatry

## 2021-09-09 NOTE — NURSING NOTE
Per ED  Patient reports he was brought to the ED by his uncle after uncle observed the patient attempting to hang self  Patient presents as guarded with vague, inconsistent responses to questions  He stated he has been unable to see his children and his brother is now in a relationship with his wife  The patient reports a history of bipolar disorder "and a lot of things " EMR indicates a history of schizoaffective disorder, bipolar type, cocaine, PCP and marijuana use  There is a reported history of a seizure disorder and PE  Patient stated he has been off of his medication for several weeks  He has no current outpatient provider  In addition to depression and suicidal ideation, he reports poor sleep and appetite, poor energy,decreased motivAtion and anhedonia  Patient also reported the feeling like people are following him and talking negatively about him  He reported a previous history of suicide attempt by hanging but was unable to provide a time frame  According to the EMR, he has previously reported an overdose, stabbing his thigh with a screw  and cutting his wrist  He has also previously reported auditory hallucinations of a male voice telling him to come here  Patient stated he uses marijuana nearly daily  Reports cocaine use "here and there " There is a history of PCP use as well, per EMR  PAtient stated he has no supports at all and does not want to return to where he had been staying  Per this writer  Pt is 39year old male 12 from [facility]  Pt speech is bizarre, disorganized and seems distracted  Laughing inappropriately  Reports having HI to brother and wife due to their affair  Denies SI/HI/AH/VH at this time and feels safe on the unit  Reports 2 suicide attempts in lifetime by hanging self and was interrupted both times  Pt reports previous psych admission 6 months ago at [facility]  Pt reports not taking any medications but depakote and xarelto  UDS+ for cocaine and THC   Medical Hx of PE,DVT and seizures (controlled)  Pt also reports thinking he had a microchip in R forearm and tried to dig it out with a butter knife a few years ago  Pt oriented to unit and cooperative with this writer

## 2021-09-10 ENCOUNTER — APPOINTMENT (INPATIENT)
Dept: RADIOLOGY | Facility: HOSPITAL | Age: 41
DRG: 750 | End: 2021-09-10
Payer: COMMERCIAL

## 2021-09-10 PROBLEM — F25.9 SCHIZOAFFECTIVE DISORDER (HCC): Status: ACTIVE | Noted: 2021-09-09

## 2021-09-10 LAB — VALPROATE SERPL-MCNC: 22.5 UG/ML (ref 50–120)

## 2021-09-10 PROCEDURE — 80164 ASSAY DIPROPYLACETIC ACD TOT: CPT | Performed by: PHYSICIAN ASSISTANT

## 2021-09-10 PROCEDURE — 99232 SBSQ HOSP IP/OBS MODERATE 35: CPT | Performed by: STUDENT IN AN ORGANIZED HEALTH CARE EDUCATION/TRAINING PROGRAM

## 2021-09-10 PROCEDURE — 73502 X-RAY EXAM HIP UNI 2-3 VIEWS: CPT

## 2021-09-10 RX ORDER — ARIPIPRAZOLE 5 MG/1
5 TABLET ORAL ONCE
Status: COMPLETED | OUTPATIENT
Start: 2021-09-10 | End: 2021-09-10

## 2021-09-10 RX ORDER — ARIPIPRAZOLE 10 MG/1
10 TABLET ORAL DAILY
Status: DISCONTINUED | OUTPATIENT
Start: 2021-09-11 | End: 2021-09-14 | Stop reason: HOSPADM

## 2021-09-10 RX ORDER — DIVALPROEX SODIUM 250 MG/1
250 TABLET, EXTENDED RELEASE ORAL 2 TIMES DAILY
Status: DISCONTINUED | OUTPATIENT
Start: 2021-09-11 | End: 2021-09-13

## 2021-09-10 RX ADMIN — NICOTINE POLACRILEX 2 MG: 2 GUM, CHEWING ORAL at 13:17

## 2021-09-10 RX ADMIN — ARIPIPRAZOLE 5 MG: 5 TABLET ORAL at 13:17

## 2021-09-10 RX ADMIN — DIVALPROEX SODIUM 500 MG: 500 TABLET, EXTENDED RELEASE ORAL at 08:26

## 2021-09-10 RX ADMIN — RIVAROXABAN 20 MG: 20 TABLET, FILM COATED ORAL at 18:01

## 2021-09-10 RX ADMIN — ACETAMINOPHEN 975 MG: 325 TABLET ORAL at 08:26

## 2021-09-10 NOTE — ASSESSMENT & PLAN NOTE
· Patient reports h/o asthma, uses albuterol as needed  · Most recent vitals stable, patient afebrile  · Continue to monitor vitals, symptoms  · Albuterol PRN

## 2021-09-10 NOTE — ASSESSMENT & PLAN NOTE
· Patient originally presented to [facility] ED 09/09/2021 for evaluation of suicidal ideations  · Currently 201 status  · Management per Psychiatry

## 2021-09-10 NOTE — CASE MANAGEMENT
Pt is a 39year old single male admitted 9/9/21 0347 as a 201 from [facility] ED  CW met with pt in order to complete initial intake/assessment and pt presents as cooperative  Pt reports 5 past IP psych admissions last a month ago in [facility]     Pt reports he was residing at [place] [id number] with his mom and family but that they recently moved to [facility]  Pt reports he would like to eventually go to [facility] with them but is interested in discharging to a shelter until then  Pt reports there is a shelter in [place] he would like to go to  Pt reports he will need transport upon disch  Pt reports correct cell number as [phone]  Pt denies OP [facility] provider but reports being open to a referral     Pt denies having an ICM but reports being open to a referral     Pt reports he is unsure if he has a PCP and that it has been many years since he has seen a doctor, resources for choosing a PCP listed in AVS      Pt reports primary supports as mom and sister, pt declined to sign TYRELL's for [facility], D&A, and CRISIS resources placed in AVS     Pt AUDIT 0, UDS+ THC and Cocaine, PAWSS 0  Pt denies drug or alcohol issues when CW asked about cocaine in UDS pt stated "3 days ago one of my friends told me it was weed but it had cocaine in it"  Transportation Pt reports he has transport through an agency due to his seizures    [place] [address] home? Y/N with who No, will need transport    Access to firearms Pt denies    Supports Sister, mom, family   Legal Pt denies    Education  9th grade, no GED   Employed? Y/N Where Unemployed   Income Source/How much SSD, 715 monthly      Pt denies stressors/barriers/triggers  Pt denies coping skills    Pt reports chief complaint as "I was going to hang myself"

## 2021-09-10 NOTE — NURSING NOTE
Pt visible on unit and social with select peers  Pt approached nurse requesting a room change due to issues with his roommate, discussed with pt that a room change will happen later in the day when discharges occur, pt was agreeable to this  Denies psych symptoms at this time  Irritable edge with pressured speech  Medication and meal compliant  Denies any unmet needs or complaints at this time  Will monitor

## 2021-09-10 NOTE — PLAN OF CARE
Met with patient, pt states "I am feeling well and would like to go to [place] to my girlfriend"  Pt states that his girlfriend is 7+ months pregnant and he must go home to cash Girlfriend's check to help  Pt denied having SI, HI and AVH  Pt initially wanted to go to recovery center in [place], [place] then contemplating about going girlfriend in in [place]  Encouraged compliance with meds and unit activities  Continue to monitor       Problem: SUBSTANCE USE/ABUSE  Goal: Will have no detox symptoms and will verbalize plan for changing substance-related behavior  Description: INTERVENTIONS:  - Monitor physical status and assess for symptoms of withdrawal  - Administer medication as ordered  - Provide emotional support with 1 on 1 interaction with staff  - Encourage recovery focused program/ addiction education  - Assess for verbalization of changing behaviors related to substance abuse  - Initiate consults and referrals as appropriate (Case Management, Spiritual Care, etc )  Outcome: Progressing  Goal: By discharge, will develop insight into their chemical dependency and sustain motivation to continue in recovery  Description: INTERVENTIONS:  - Attends all daily group sessions and scheduled AA groups  - Actively practices coping skills through participation in the therapeutic community and adherence to program rules  - Reviews and completes assignments from individual treatment plan  - Assist patient development of understanding of their personal cycle of addiction and relapse triggers  Outcome: Progressing     Problem: DISCHARGE PLANNING  Goal: Discharge to home or other facility with appropriate resources  Description: INTERVENTIONS:  - Identify barriers to discharge w/patient and caregiver  - Arrange for needed discharge resources and transportation as appropriate  - Identify discharge learning needs (meds, wound care, etc )  - Arrange for interpretive services to assist at discharge as needed  - Refer to Case Management Department for coordinating discharge planning if the patient needs post-hospital services based on physician/advanced practitioner order or complex needs related to functional status, cognitive ability, or social support system  Outcome: Progressing     Problem: METABOLIC, FLUID AND ELECTROLYTES - ADULT  Goal: Electrolytes maintained within normal limits  Description: INTERVENTIONS:  - Monitor labs and assess patient for signs and symptoms of electrolyte imbalances  - Administer electrolyte replacement as ordered  - Monitor response to electrolyte replacements, including repeat lab results as appropriate  - Instruct patient on fluid and nutrition as appropriate  Outcome: Progressing  Goal: Fluid balance maintained  Description: INTERVENTIONS:  - Monitor labs   - Monitor I/O and WT  - Instruct patient on fluid and nutrition as appropriate  - Assess for signs & symptoms of volume excess or deficit  Outcome: Progressing  Goal: Glucose maintained within target range  Description: INTERVENTIONS:  - Monitor Blood Glucose as ordered  - Assess for signs and symptoms of hyperglycemia and hypoglycemia  - Administer ordered medications to maintain glucose within target range  - Assess nutritional intake and initiate nutrition service referral as needed  Outcome: Progressing     Problem: Risk for Self Injury/Neglect  Goal: Verbalize thoughts and feelings  Description: Interventions:  - Assess and re-assess patient's lethality and potential for self-injury  - Engage patient in 1:1 interactions, daily, for a minimum of 15 minutes  - Encourage patient to express feelings, fears, frustrations, hopes  - Establish rapport/trust with patient   Outcome: Progressing  Goal: Refrain from harming self  Description: Interventions:  - Monitor patient closely, per order  - Develop a trusting relationship  - Supervise medication ingestion, monitor effects and side effects   Outcome: Progressing  Goal: Attend and participate in unit activities, including therapeutic, recreational, and educational groups  Description: Interventions:  - Provide therapeutic and educational activities daily, encourage attendance and participation, and document same in the medical record  - Obtain collateral information, encourage visitation and family involvement in care   Outcome: Progressing     Problem: CARDIOVASCULAR - ADULT  Goal: Maintains optimal cardiac output and hemodynamic stability  Description: INTERVENTIONS:  - Monitor I/O, vital signs and rhythm  - Monitor for S/S and trends of decreased cardiac output  - Administer and titrate ordered vasoactive medications to optimize hemodynamic stability  - Assess quality of pulses, skin color and temperature  - Assess for signs of decreased coronary artery perfusion  - Instruct patient to report change in severity of symptoms  Outcome: Progressing     Problem: RESPIRATORY - ADULT  Goal: Achieves optimal ventilation and oxygenation  Description: INTERVENTIONS:  - Assess for changes in respiratory status  - Assess for changes in mentation and behavior  - Position to facilitate oxygenation and minimize respiratory effort  - Oxygen administered by appropriate delivery if ordered  - Initiate smoking cessation education as indicated  - Encourage broncho-pulmonary hygiene including cough, deep breathe, Incentive Spirometry  - Assess the need for suctioning and aspirate as needed  - Assess and instruct to report SOB or any respiratory difficulty  - Respiratory Therapy support as indicated  Outcome: Progressing     Problem: Ineffective Coping  Goal: Participates in unit activities  Description: Interventions:  - Provide therapeutic environment   - Provide required programming   - Redirect inappropriate behaviors   Outcome: Progressing

## 2021-09-10 NOTE — ASSESSMENT & PLAN NOTE
· Patient with h/o multiple DVTs, PE  · Continue Xarelto 20 mg daily   · Recommend follow-up PCP outpatient for ongoing monitoring/maintenance
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
## 2021-09-10 NOTE — TREATMENT TEAM
09/10/21 0902   Team Meeting   Meeting Type Daily Rounds   Team Members Present   Team Members Present Physician;Nurse;; Other (Discipline and Name)   Physician Team Member Gerardo   [facility] Member [place] Florez   Other (Discipline and Name)    Patient/Family Present   Patient Present No   Patient's Family Present No     201, admitted due to suicidal ideation -attempt to hang himself  Readmit score 12  UDS positive for Cocaine and THC  Continue assessment  Continue to monitor

## 2021-09-10 NOTE — TREATMENT TEAM
09/10/21 1457   Team Meeting   Meeting Type Tx Team Meeting   Team Members Present   Team Members Present Physician;Nurse;   Physician Team Member Evert Bowden51 Team Member [facility] Management Team Member Jimena   Patient/Family Present   Patient Present Yes   Patient's Family Present No     Treatment team met, reviewed treatment goals, diagnosis, meds, and discharge plan  Pt is in agreement and signed treatment plan  A copy of the treatment plan is placed in patient's folder  Continue to monitor

## 2021-09-10 NOTE — NURSING NOTE
Assumed care of patient at 1900  Patient denies all symptoms  He was flat, appears depressed and has been withdrawn to room  Will continue to monitor

## 2021-09-10 NOTE — DISCHARGE INSTR - APPOINTMENTS
Melita Sheriff RN, our [employer], will be calling you after your discharge, on the phone number that you provided  She will be available as an additional support, if needed  If you wish to speak with her, you may contact Zoey Encinas at [phone]

## 2021-09-10 NOTE — TREATMENT PLAN
TREATMENT PLAN REVIEW - [address] 39 y o  1980 male MRN: [id number]    [address] [place] [address] Room / Bed: [place] [address] [address] Encounter: [id number]          Admit Date/Time:  9/9/2021  3:47 AM    Treatment Team: Attending Provider: Prem Crook MD; Licensed Practical Nurse: Sergei Flynn LPN; Registered Nurse: Marie Ruiz, RN; Care Manager: Olu Leach, RN; Registered Nurse: Sujata Mclain, ARMANDO; : Meggan Randle; Patient Care Assistant: Ruchi Blount; Resident: Byron Ryder DO; Patient Care Technician: Ned Anderson;  : Conchis Monaco; Registered Nurse: Reynaldo Parekh RN    Diagnosis: Principal Problem:    Mood disorder [facility])  Active Problems:    Medical clearance for psychiatric admission    Seizure disorder ([facility] )    History of DVT in adulthood    Tobacco dependence due to cigarettes    Substance abuse ([facility] )    Acute hip pain, left    Mild intermittent asthma without complication      Patient Strengths/Assets: compliant with medication, negotiates basic needs, patient is on a voluntary commitment, patient is willing to work on problems    Patient Barriers/Limitations: difficulty adapting, lack of stable employment, poor insight, poor support system    Short Term Goals: decrease in depressive symptoms, decrease in anxiety symptoms, decrease in suicidal thoughts, ability to stay safe on the unit, improvement in insight, improvement in reasoning ability, improvement in self care, mood stabilization    Long Term Goals: improvement in depression, improvement in anxiety, stabilization of mood, free of suicidal thoughts, free of homicidal thoughts, no self abusive behavior, improvement in reasoning ability, improved insight, adequate sleep, appropriate interaction with peers    Progress Towards Goals: starting psychiatric medications as prescribed    Recommended Treatment: medication management, patient medication education, group therapy, milieu therapy, continued Behavioral Health psychiatric evaluation/assessment process    Treatment Frequency: daily medication monitoring, group and milieu therapy daily, monitoring through interdisciplinary rounds, monitoring through weekly patient care conferences    Expected Discharge Date:  9/17/21    Discharge Plan: discharge to home    Treatment Plan Created/Updated By: Marissa Pierre MD
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
## 2021-09-10 NOTE — DISCHARGE INSTR - OTHER ORDERS
You will be discharged to with and at [facility] at [address] [place], [place]    Your meds will be filled at [facility] and provided to you prior to discharge  After discharge, if you find your coping skills are not as effective and you continue to feel distressed please call Chan Arciniega services are available 24 hours a day by calling [facility] ([facility]) AT [place] at [phone], and [address] : [phone]    Crisis Text Line : 424015     If you feel you are a danger to yourself or others please contact 911 or go to nearest Emergency room to seek immediate help  [address]  [address] [place], [place]  Phone # [phone] or [phone]  Crisis Intervention is the 24 hour emergency mental health service provided by the [address] [place] [address]/Intellectual Disabilities Program  Crisis Intervention staff provide supportive counseling, outreach, assessment, and referral information to individuals experiencing an emotional crisis or difficulty in coping with a personal problem     Crisis staff works to provide an immediate response to people experiencing a mental health emergency by:   De-escalating and supporting an individual or family in a crisis   Serve as a liaison to police, emergency departments, mental health facilities, and providers   Provide risk assessments and psychosocial assessment   Provide brief follow-up and support to individuals and families after a crisis   Provide information and referrals to individuals and families   Identify alternatives to hospitalization when possible, and facilitate voluntary or involuntary hospitalization when appropriate   Provide mental health disaster responses to the community, including first responders   Provide consultation and mental health training to community agencies  Crisis Intervention provides telephone, walk-in and mobile services, which are all available 24-hours a day through the Crisis Intervention Office  Telephone Crisis or hot line services consist of telephone counseling, consultation and referral for individuals who exhibit an acute problem of mood or thought disturbance, or emotional distress that may include behavior or social relationships  Walk-in Crisis services are face-to-face contacts with individuals in crisis or with individuals seeking help for persons in crisis  Services include assessment, information and referral, crisis counseling, crisis resolution, and accessing community resources   Mobile Crisis services are individually and team delivered crisis intervention services that are provided at a community site where the crisis is occurring or a place where a person in crisis is located, such as the person's home or school  Mobile Crisis services include assessment, counseling, crisis resolution, referral and follow-up  Augustus Chapaalex is a confidential 7 days/week telephone support service manned by trained mental health consumers  [employer] operates daily but is not able to accept calls between 2AM-6AM  [employer] provides support, a listening ear and can provide information about available services  [employer] specializes in the concerns of mental health consumers, their families and friends  However, we are also here for anyone who has a mental health concern, is confused about or just doesn't know anything about mental health or where to get information  To reach Augustus Mccrary, call [phone] accepts calls between 6:00 AM to 10:00 AM and from 4:00 PM to 12:00 AM     Text CONNECT to 473717 from anywhere in the [place], anytime, about any type of crisis  A live, trained Crisis Counselor receives the text and lets you know that they are here to listen  The volunteer Crisis Counselor will help you move from a hot moment to a cool moment        The [employer] on Mental Illness ([facility]) offers various education & support groups for you & your family  For more information visit their website at http://www Loop/     Dial 2-1-1 to get connected/get help  Free, confidential information & referral available 24/7: Aging Services, Child & Youth Services, Counseling, Education/Training, Food/Shelter/Clothing, Health Services, Parenting, Substance Abuse, Support Groups, Volunteer Opportunities, & much more  Phone: 2-1-1 or [phone], Web: STANLEY DO991MQAS [facility], Email: Awilda@yahoo com    The National Suicide Prevention Lifeline, 0-731-528-RNRT (4662), is a federally funded, 24-hour, toll-free suicide prevention service comprised of more than 120 individual crisis centers across the country  This service is available to anyone in suicidal crisis, emotional distress or those concerned about a friend or loved one  Https://suicidepreventionlifeline org/    Find more resources at [address] Drug and Alcohol Resources     Drug & Alcohol Services  [address] [place] [address]  Phone: [phone]   Fax: [phone]   Hours: Monday - Friday, 8:00 am - 4:30 pm    Request for Evaluation  [facility] ([facility]) residents can seek drug and alcohol services in several ways  One way is to contact our office, [address] [place], [place] [address]vd and Alcohol Services, at [phone]  We are located at [address] [place] [address]  Residents may also contact one of the licensed drug and alcohol outpatient providers that we contract with listed below  Both options will put you in touch with a drug and alcohol  who will start by asking you several screening questions  Then they will schedule you for a full drug and alcohol assessment to make any referrals necessary  They will also be able to determine your eligibility for any funding assistance that may be needed    Call these agencies if you would like to be evaluated for treatment services: [facility]   [address] [place], [place] [address]  [phone]  [facility]  [address]   [place], [address]  [phone]   [address]   [place], [address]  [phone] [address]  [facility], [address]  [phone]   [facility], [address] [facility], [address]  [address] [facility] [address] [facility]  [address], [address]  [phone]   [facility]  [address] [place], [address] [facility]  [phone]  [facility]  [address] [facility] [address]  [phone]  [facility]  [address] [place] [address], [address]  [phone]   [employer] [address] [facility], [address] [facility], [address]   [phone]       How to [address]    Should you change your mind and need a primary care doctor you can contact members services on the back of your insurance card for assistance in changing or finding a primary care doctor, you may also be able to view the members services information located on your insurance companies website

## 2021-09-10 NOTE — PLAN OF CARE
Problem: SUBSTANCE USE/ABUSE  Goal: Will have no detox symptoms and will verbalize plan for changing substance-related behavior  Description: INTERVENTIONS:  - Monitor physical status and assess for symptoms of withdrawal  - Administer medication as ordered  - Provide emotional support with 1 on 1 interaction with staff  - Encourage recovery focused program/ addiction education  - Assess for verbalization of changing behaviors related to substance abuse  - Initiate consults and referrals as appropriate (Case Management, Spiritual Care, etc )  Outcome: Progressing  Goal: By discharge, will develop insight into their chemical dependency and sustain motivation to continue in recovery  Description: INTERVENTIONS:  - Attends all daily group sessions and scheduled AA groups  - Actively practices coping skills through participation in the therapeutic community and adherence to program rules  - Reviews and completes assignments from individual treatment plan  - Assist patient development of understanding of their personal cycle of addiction and relapse triggers  Outcome: Progressing     Problem: DISCHARGE PLANNING  Goal: Discharge to home or other facility with appropriate resources  Description: INTERVENTIONS:  - Identify barriers to discharge w/patient and caregiver  - Arrange for needed discharge resources and transportation as appropriate  - Identify discharge learning needs (meds, wound care, etc )  - Arrange for interpretive services to assist at discharge as needed  - Refer to Case Management Department for coordinating discharge planning if the patient needs post-hospital services based on physician/advanced practitioner order or complex needs related to functional status, cognitive ability, or social support system  Outcome: Progressing     Problem: Risk for Self Injury/Neglect  Goal: Treatment Goal: Remain safe during length of stay, learn and adopt new coping skills, and be free of self-injurious ideation, impulses and acts at the time of discharge  Outcome: Progressing  Goal: Verbalize thoughts and feelings  Description: Interventions:  - Assess and re-assess patient's lethality and potential for self-injury  - Engage patient in 1:1 interactions, daily, for a minimum of 15 minutes  - Encourage patient to express feelings, fears, frustrations, hopes  - Establish rapport/trust with patient   Outcome: Progressing  Goal: Refrain from harming self  Description: Interventions:  - Monitor patient closely, per order  - Develop a trusting relationship  - Supervise medication ingestion, monitor effects and side effects   Outcome: Progressing  Goal: Attend and participate in unit activities, including therapeutic, recreational, and educational groups  Description: Interventions:  - Provide therapeutic and educational activities daily, encourage attendance and participation, and document same in the medical record  - Obtain collateral information, encourage visitation and family involvement in care   Outcome: Progressing     Problem: RESPIRATORY - ADULT  Goal: Achieves optimal ventilation and oxygenation  Description: INTERVENTIONS:  - Assess for changes in respiratory status  - Assess for changes in mentation and behavior  - Position to facilitate oxygenation and minimize respiratory effort  - Oxygen administered by appropriate delivery if ordered  - Initiate smoking cessation education as indicated  - Encourage broncho-pulmonary hygiene including cough, deep breathe, Incentive Spirometry  - Assess the need for suctioning and aspirate as needed  - Assess and instruct to report SOB or any respiratory difficulty  - Respiratory Therapy support as indicated  Outcome: Progressing

## 2021-09-10 NOTE — ASSESSMENT & PLAN NOTE
· Patient reports smoking 1 5-2 ppd  · Encourage cessation  · Continue Nicorette gum as needed; patient refuses patch

## 2021-09-10 NOTE — ASSESSMENT & PLAN NOTE
· Patient reports last seizure occurred yesterday 09/08/2021  · Patient notes that he does not currently follow with outpatient Neurology  · Continue Depakote 250 mg BID  · Seizure precautions  · States he wasn't taking meds for couple weeks- Check valproic acid levels in AM  · Recommend f/u PCP and neurology outpatient for ongoing monitoring/maintenance

## 2021-09-11 PROCEDURE — 99232 SBSQ HOSP IP/OBS MODERATE 35: CPT | Performed by: PSYCHIATRY & NEUROLOGY

## 2021-09-11 RX ADMIN — DIVALPROEX SODIUM 250 MG: 250 TABLET, EXTENDED RELEASE ORAL at 08:28

## 2021-09-11 RX ADMIN — RIVAROXABAN 20 MG: 20 TABLET, FILM COATED ORAL at 17:00

## 2021-09-11 RX ADMIN — NICOTINE POLACRILEX 2 MG: 2 GUM, CHEWING ORAL at 10:25

## 2021-09-11 RX ADMIN — ARIPIPRAZOLE 10 MG: 10 TABLET ORAL at 08:28

## 2021-09-11 NOTE — PLAN OF CARE
Problem: SUBSTANCE USE/ABUSE  Goal: Will have no detox symptoms and will verbalize plan for changing substance-related behavior  Description: INTERVENTIONS:  - Monitor physical status and assess for symptoms of withdrawal  - Administer medication as ordered  - Provide emotional support with 1 on 1 interaction with staff  - Encourage recovery focused program/ addiction education  - Assess for verbalization of changing behaviors related to substance abuse  - Initiate consults and referrals as appropriate (Case Management, Spiritual Care, etc )  Outcome: Progressing  Goal: By discharge, will develop insight into their chemical dependency and sustain motivation to continue in recovery  Description: INTERVENTIONS:  - Attends all daily group sessions and scheduled AA groups  - Actively practices coping skills through participation in the therapeutic community and adherence to program rules  - Reviews and completes assignments from individual treatment plan  - Assist patient development of understanding of their personal cycle of addiction and relapse triggers  Outcome: Progressing     Problem: DISCHARGE PLANNING  Goal: Discharge to home or other facility with appropriate resources  Description: INTERVENTIONS:  - Identify barriers to discharge w/patient and caregiver  - Arrange for needed discharge resources and transportation as appropriate  - Identify discharge learning needs (meds, wound care, etc )  - Arrange for interpretive services to assist at discharge as needed  - Refer to Case Management Department for coordinating discharge planning if the patient needs post-hospital services based on physician/advanced practitioner order or complex needs related to functional status, cognitive ability, or social support system  Outcome: Progressing     Problem: METABOLIC, FLUID AND ELECTROLYTES - ADULT  Goal: Electrolytes maintained within normal limits  Description: INTERVENTIONS:  - Monitor labs and assess patient for signs and symptoms of electrolyte imbalances  - Administer electrolyte replacement as ordered  - Monitor response to electrolyte replacements, including repeat lab results as appropriate  - Instruct patient on fluid and nutrition as appropriate  Outcome: Progressing  Goal: Fluid balance maintained  Description: INTERVENTIONS:  - Monitor labs   - Monitor I/O and WT  - Instruct patient on fluid and nutrition as appropriate  - Assess for signs & symptoms of volume excess or deficit  Outcome: Progressing  Goal: Glucose maintained within target range  Description: INTERVENTIONS:  - Monitor Blood Glucose as ordered  - Assess for signs and symptoms of hyperglycemia and hypoglycemia  - Administer ordered medications to maintain glucose within target range  - Assess nutritional intake and initiate nutrition service referral as needed  Outcome: Progressing     Problem: CARDIOVASCULAR - ADULT  Goal: Maintains optimal cardiac output and hemodynamic stability  Description: INTERVENTIONS:  - Monitor I/O, vital signs and rhythm  - Monitor for S/S and trends of decreased cardiac output  - Administer and titrate ordered vasoactive medications to optimize hemodynamic stability  - Assess quality of pulses, skin color and temperature  - Assess for signs of decreased coronary artery perfusion  - Instruct patient to report change in severity of symptoms  Outcome: Progressing     Problem: RESPIRATORY - ADULT  Goal: Achieves optimal ventilation and oxygenation  Description: INTERVENTIONS:  - Assess for changes in respiratory status  - Assess for changes in mentation and behavior  - Position to facilitate oxygenation and minimize respiratory effort  - Oxygen administered by appropriate delivery if ordered  - Initiate smoking cessation education as indicated  - Encourage broncho-pulmonary hygiene including cough, deep breathe, Incentive Spirometry  - Assess the need for suctioning and aspirate as needed  - Assess and instruct to report SOB or any respiratory difficulty  - Respiratory Therapy support as indicated  Outcome: Progressing     Problem: Risk for Self Injury/Neglect  Goal: Treatment Goal: Remain safe during length of stay, learn and adopt new coping skills, and be free of self-injurious ideation, impulses and acts at the time of discharge  Outcome: Progressing  Goal: Verbalize thoughts and feelings  Description: Interventions:  - Assess and re-assess patient's lethality and potential for self-injury  - Engage patient in 1:1 interactions, daily, for a minimum of 15 minutes  - Encourage patient to express feelings, fears, frustrations, hopes  - Establish rapport/trust with patient   Outcome: Progressing  Goal: Refrain from harming self  Description: Interventions:  - Monitor patient closely, per order  - Develop a trusting relationship  - Supervise medication ingestion, monitor effects and side effects   Outcome: Progressing  Goal: Attend and participate in unit activities, including therapeutic, recreational, and educational groups  Description: Interventions:  - Provide therapeutic and educational activities daily, encourage attendance and participation, and document same in the medical record  - Obtain collateral information, encourage visitation and family involvement in care   Outcome: Progressing     Problem: Ineffective Coping  Goal: Participates in unit activities  Description: Interventions:  - Provide therapeutic environment   - Provide required programming   - Redirect inappropriate behaviors   Outcome: Progressing

## 2021-09-11 NOTE — NURSING NOTE
Patient has a flat affect  Appears depressed  He is visible and social with select peers  Cooperative with medications  Denies SI, HI, AH, VH  Denies any side effects from medications  Does not report any unmet needs  Will continue to monitor

## 2021-09-11 NOTE — PROGRESS NOTES
Progress Note - Pilar [address][age] y o  male MRN: [id number]  Unit/Bed#: [facility] 347-01 Encounter: [id number]    Assessment/Plan   Principal Problem:    Schizoaffective disorder ([place] [address] )  Active Problems:    Medical clearance for psychiatric admission    Seizure disorder ([place] [address] )    History of DVT in adulthood    Tobacco dependence due to cigarettes    Substance abuse ([place] [address] )    Acute hip pain, left    Mild intermittent asthma without complication    Per nursing: he denied all symptoms  He appeared flat and depressed  He was social and visible with peers and med compliant  He has been vague and guarded  Per patient: the patient stated he slept well last night  He denied any issues with others on the unit  He wants to know how he is going to get back to [place], [place]  He denied SI/HI  He denied feeling depressed or anxious  He denied any physical complaints  He wants to cut his hair, "its driving me crazy, its itchy "  Behavior over the last 24 hours:  unchanged  Sleep: normal  Appetite: normal  Medication side effects: No  ROS: skin lesions in axila areas  Mental Status Evaluation:  Appearance:  bearded and older than stated age   Behavior:  guarded   Speech:  normal volume   Mood:  decreased range and dysthymic   Affect:  constricted   Thought Process:  concrete   Thought Content:  normal   Perceptual Disturbances: None   Risk Potential: Suicidal Ideations none  Homicidal Ideations none  Potential for Aggression No   Sensorium:  person and place   Memory:  recent and remote memory grossly intact   Consciousness:  alert and awake    Attention: attention span and concentration were age appropriate   Insight:  limited   Judgment: limited   Gait/Station: normal gait/station and normal balance   Motor Activity: no abnormal movements     Progress Toward Goals: to cut his hair    Recommended Treatment: Continue with group therapy, milieu therapy and occupational therapy        Risks, benefits and possible side effects of Medications:   Risks, benefits, and possible side effects of medications explained to patient and patient verbalizes understanding  Medications:   all current active meds have been reviewed, continue current psychiatric medications, current meds:   Current Facility-Administered Medications   Medication Dose Route Frequency    acetaminophen (TYLENOL) tablet 650 mg  650 mg Oral Q6H PRN    acetaminophen (TYLENOL) tablet 975 mg  975 mg Oral Q8H PRN    albuterol (PROVENTIL HFA,VENTOLIN HFA) inhaler 2 puff  2 puff Inhalation Q4H PRN    ARIPiprazole (ABILIFY) tablet 10 mg  10 mg Oral Daily    benztropine (COGENTIN) injection 1 mg  1 mg Intramuscular Q4H PRN Max 6/day    benztropine (COGENTIN) tablet 1 mg  1 mg Oral Q4H PRN Max 6/day    divalproex sodium (DEPAKOTE ER) 24 hr tablet 250 mg  250 mg Oral BID    nicotine polacrilex (NICORETTE) gum 2 mg  2 mg Oral Q2H PRN    OLANZapine (ZyPREXA) tablet 10 mg  10 mg Oral Q3H PRN Max 3/day    Or    OLANZapine (ZyPREXA) IM injection 10 mg  10 mg Intramuscular Q3H PRN Max 3/day    OLANZapine (ZyPREXA) tablet 5 mg  5 mg Oral Q3H PRN Max 6/day    Or    OLANZapine (ZyPREXA) IM injection 5 mg  5 mg Intramuscular Q3H PRN Max 6/day    OLANZapine (ZyPREXA) tablet 2 5 mg  2 5 mg Oral Q3H PRN Max 8/day    propranolol (INDERAL) tablet 10 mg  10 mg Oral Q8H PRN    rivaroxaban (XARELTO) tablet 20 mg  20 mg Oral Daily With Dinner    and planned medication changes: no med changes today  He can have his hair cut by staff  Labs: I have personally reviewed all pertinent laboratory/tests results     Most Recent Labs:   Lab Results   Component Value Date    WBC 8 15 09/15/2017    RBC 5 04 09/15/2017    HGB 15 8 09/15/2017    HCT 47 0 09/15/2017     09/15/2017    RDW 14 4 09/15/2017    NEUTROABS 5 61 09/15/2017    SODIUM 139 09/15/2017    K 3 9 09/15/2017     09/15/2017    CO2 26 09/15/2017    BUN 12 09/15/2017    CREATININE 0 97 09/15/2017    GLUC 86 09/15/2017    GLUF 85 08/23/2017    CALCIUM 9 7 09/15/2017    AST 20 08/23/2017    ALT 40 08/23/2017    ALKPHOS 85 08/23/2017    TP 7 8 08/23/2017    ALB 3 8 08/23/2017    TBILI 0 31 08/23/2017    CHOLESTEROL 147 08/23/2017    HDL 39 (L) 08/23/2017    TRIG 148 08/23/2017    LDLCALC 78 08/23/2017    VALPROICTOT 22 5 (L) 09/10/2021    [id number] 1 120 08/23/2017    HGBA1C 5 4 08/23/2017     08/23/2017       Counseling / Coordination of Care  Total floor / unit time spent today 15 minutes  Greater than 50% of total time was spent with the patient and / or family counseling and / or coordination of care  A description of the counseling / coordination of care: treatment team and patient

## 2021-09-12 PROBLEM — M79.662 PAIN OF LEFT CALF: Status: ACTIVE | Noted: 2021-09-12

## 2021-09-12 PROCEDURE — 99232 SBSQ HOSP IP/OBS MODERATE 35: CPT | Performed by: PHYSICIAN ASSISTANT

## 2021-09-12 PROCEDURE — 99232 SBSQ HOSP IP/OBS MODERATE 35: CPT | Performed by: PSYCHIATRY & NEUROLOGY

## 2021-09-12 RX ADMIN — DIVALPROEX SODIUM 250 MG: 250 TABLET, EXTENDED RELEASE ORAL at 08:49

## 2021-09-12 RX ADMIN — RIVAROXABAN 20 MG: 20 TABLET, FILM COATED ORAL at 17:06

## 2021-09-12 RX ADMIN — ARIPIPRAZOLE 10 MG: 10 TABLET ORAL at 08:49

## 2021-09-12 RX ADMIN — DIVALPROEX SODIUM 250 MG: 250 TABLET, EXTENDED RELEASE ORAL at 21:24

## 2021-09-12 RX ADMIN — PROPRANOLOL HYDROCHLORIDE 10 MG: 10 TABLET ORAL at 16:43

## 2021-09-12 NOTE — ASSESSMENT & PLAN NOTE
· Patient with left calf pain, exacerbated by walking  · History of left lower extremity DVT  · On physical exam, patients left calf is tender, positive flaquita sign, no erythema, edema, or warmth  · Will order VAS lower extremity

## 2021-09-12 NOTE — PROGRESS NOTES
[facility]  Progress Note - Jackson Newton 1980, 39 y o  male MRN: [id number]  Unit/Bed#: [facility] 347-01 Encounter: [id number]  Primary Care Provider: Bulmaro Sánchez   Date and time admitted to hospital: 2021  3:47 AM    Pain of left calf  Assessment & Plan  · Patient with left calf pain, exacerbated by walking  · History of left lower extremity DVT in 2016  · On physical exam, patients left calf is tender, positive flaquita sign, no erythema, edema, or warmth  · Currently on Xarelto 20mg daily  · Will order VAS lower extremity     Nurse Coordination of Care Discussion: Discussed assessment and plan with primary RN    Discussions with Specialists or Other Care Team Provider: None    Education and Discussions with Family / Patient: Answered patients questions to the best of my ability    Time Spent for Care: 20 minutes  More than 50% of total time spent on counseling and coordination of care as described above  Current Length of Stay: 3 day(s)    Code Status: Level 1 - Full Code      Subjective:   Patient complaining of left calf pain, exacerbated by walking  States he has chronic DVT for 5 years  Denies chest pain, sob, PERRY, erythema, edmea, or warmth of the left lower extremity  Objective:     Vitals:   Temp (24hrs), Av °F (36 1 °C), Min:96 8 °F (36 °C), Max:97 1 °F (36 2 °C)    Temp:  [96 8 °F (36 °C)-97 1 °F (36 2 °C)] 97 1 °F (36 2 °C)  HR:  [82-92] 82  Resp:  [16] 16  BP: (103-114)/(68) 114/68  SpO2:  [99 %-100 %] 100 %  Body mass index is 34 03 kg/m²  Physical Exam:     Physical Exam  Musculoskeletal:        Legs:       Comments: Positive homans sign             Additional Data:     Labs:                            * I Have Reviewed All Lab Data Listed Above  * Additional Pertinent Lab Tests Reviewed:  [facility] Admission Reviewed    Imaging:    Imaging Reports Reviewed Today Include: No imaging reviewed today      Last 24 Hours Medication List:   Current Facility-Administered Medications   Medication Dose Route Frequency Provider Last Rate    acetaminophen  650 mg Oral Q6H PRN Linda Damico PA-C      acetaminophen  975 mg Oral Q8H PRN Linda Damico PA-C      albuterol  2 puff Inhalation Q4H PRN Linda Damico PA-C      ARIPiprazole  10 mg Oral Daily Yoli Ford MD      benztropine  1 mg Intramuscular Q4H PRN Max 6/day Yoli Ford MD      benztropine  1 mg Oral Q4H PRN Max 6/day Yoli Ford MD      divalproex sodium  250 mg Oral BID Trini Parekh DO      nicotine polacrilex  2 mg Oral Q2H PRN Yoli Ford MD      OLANZapine  10 mg Oral Q3H PRN Max 3/day Yoli Ford MD      Or    OLANZapine  10 mg Intramuscular Q3H PRN Max 3/day Yoli Ford MD      OLANZapine  5 mg Oral Q3H PRN Max 6/day Yoli Ford MD      Or    OLANZapine  5 mg Intramuscular Q3H PRN Max 6/day Yoli Ford MD      OLANZapine  2 5 mg Oral Q3H PRN Max 8/day Yoli Ford MD      propranolol  10 mg Oral Q8H PRN Yoli Ford MD      rivaroxaban  20 mg Oral Daily With Miguelito Jeffers MD          Today, Patient Was Seen By: Collette Smalls, PA-C      ** Please Note: Dictation voice to text software may have been used in the creation of this document   **

## 2021-09-12 NOTE — NURSING NOTE
Pt mostly isolative to room  Interacts with select peers  Minimal in conversation with staff  Mildly irritable edge  No SI/HI  Denies all symptoms  Ate breakfast  Compliant with scheduled medications  Spoke with pt regarding medication refusal last evening  Pt stated "I was knocked out " Pt encouraged to ensure he takes medications this evening as he has scheduled blood work in AM for VPA level

## 2021-09-12 NOTE — PROGRESS NOTES
Progress Note - Pilar [address] 39 y o  male MRN: [id number]  Unit/Bed#: [facility] 347-01 Encounter: [id number]    Assessment/Plan   Principal Problem:    Schizoaffective disorder ([facility] 75 )  Active Problems:    Medical clearance for psychiatric admission    Seizure disorder ([facility] 75 )    History of DVT in adulthood    Tobacco dependence due to cigarettes    Substance abuse ([place] [address] )    Acute hip pain, left    Mild intermittent asthma without complication    Per nursing: last evening he was seen being very social and talking to a female peer in the milieu and then later she was found in his bathroom  He was in his room  They were redirected and spoken to  This did not repeat  He refused medications last night, but took them this morning  He signed a 72 hour notice yesterday  This morning he complained of left calf pain 7/10  He is on Xarelto, calf was not reported to be warm, red, or swollen according to nursing  SLKO was called as he has a hx of DVTs  Per patient: the patient stated that he signed a 72 hour notice yesterday due to his sister calling and telling him that she wants him to come live with her in [place], [place]  He was complaining of left calf pain and thinks its his blood clots  He denied SI/HI  He wishes to speak to a  tomorrow about his transportation to [place], [place]  Behavior over the last 24 hours:  unchanged  Sleep: normal  Appetite: normal  Medication side effects: No  ROS: left calf pain    Mental Status Evaluation:    Mental status:  Appearance calm and cooperative , adequate hygiene and grooming and good eye contact    Mood anxious   Affect affect was constricted   Speech a normal rate   Thought Processes perseverating on getting transportation to [place], [place]     Hallucinations no hallucinations present    Thought Content no delusions   Abnormal Thoughts no suicidal thoughts  and no homicidal thoughts    Orientation  oriented to person and place and time   Remote Memory short term memory impaired and long term memory intact   Attention Span concentration impaired   Intellect Not Formally Assessed   Insight Limited insight   Judgement judgment was limited   Muscle Strength Muscle strength and tone were normal and gait is slowed  his left calf appears to be mildly swollen as compared to the rigth, but is not hot to the touch or red  Language no difficulty naming common objects   Fund of Knowledge displays adequate knowledge of current events   Pain moderate to severe   Pain Scale 6       Progress Toward Goals: " to have the doctor look at my leg and to get to [place] "    Recommended Treatment: Continue with group therapy, milieu therapy and occupational therapy  Risks, benefits and possible side effects of Medications:   Risks, benefits, and possible side effects of medications explained to patient and patient verbalizes understanding        Medications:   all current active meds have been reviewed, continue current psychiatric medications, current meds:   Current Facility-Administered Medications   Medication Dose Route Frequency    acetaminophen (TYLENOL) tablet 650 mg  650 mg Oral Q6H PRN    acetaminophen (TYLENOL) tablet 975 mg  975 mg Oral Q8H PRN    albuterol (PROVENTIL HFA,VENTOLIN HFA) inhaler 2 puff  2 puff Inhalation Q4H PRN    ARIPiprazole (ABILIFY) tablet 10 mg  10 mg Oral Daily    benztropine (COGENTIN) injection 1 mg  1 mg Intramuscular Q4H PRN Max 6/day    benztropine (COGENTIN) tablet 1 mg  1 mg Oral Q4H PRN Max 6/day    divalproex sodium (DEPAKOTE ER) 24 hr tablet 250 mg  250 mg Oral BID    nicotine polacrilex (NICORETTE) gum 2 mg  2 mg Oral Q2H PRN    OLANZapine (ZyPREXA) tablet 10 mg  10 mg Oral Q3H PRN Max 3/day    Or    OLANZapine (ZyPREXA) IM injection 10 mg  10 mg Intramuscular Q3H PRN Max 3/day    OLANZapine (ZyPREXA) tablet 5 mg  5 mg Oral Q3H PRN Max 6/day    Or    OLANZapine (ZyPREXA) IM injection 5 mg  5 mg Intramuscular Q3H PRN Max 6/day    OLANZapine (ZyPREXA) tablet 2 5 mg  2 5 mg Oral Q3H PRN Max 8/day    propranolol (INDERAL) tablet 10 mg  10 mg Oral Q8H PRN    rivaroxaban (XARELTO) tablet 20 mg  20 mg Oral Daily With Dinner    and planned medication changes: no med changes today  SLIM to see patient about calf  Labs: I have personally reviewed all pertinent laboratory/tests results  Most Recent Labs:   Lab Results   Component Value Date    WBC 8 15 09/15/2017    RBC 5 04 09/15/2017    HGB 15 8 09/15/2017    HCT 47 0 09/15/2017     09/15/2017    RDW 14 4 09/15/2017    NEUTROABS 5 61 09/15/2017    SODIUM 139 09/15/2017    K 3 9 09/15/2017     09/15/2017    CO2 26 09/15/2017    BUN 12 09/15/2017    CREATININE 0 97 09/15/2017    GLUC 86 09/15/2017    GLUF 85 08/23/2017    CALCIUM 9 7 09/15/2017    AST 20 08/23/2017    ALT 40 08/23/2017    ALKPHOS 85 08/23/2017    TP 7 8 08/23/2017    ALB 3 8 08/23/2017    TBILI 0 31 08/23/2017    CHOLESTEROL 147 08/23/2017    HDL 39 (L) 08/23/2017    TRIG 148 08/23/2017    LDLCALC 78 08/23/2017    VALPROICTOT 22 5 (L) 09/10/2021    [id number] 1 120 08/23/2017    HGBA1C 5 4 08/23/2017     08/23/2017       Counseling / Coordination of Care  Total floor / unit time spent today 15 minutes  Greater than 50% of total time was spent with the patient and / or family counseling and / or coordination of care  A description of the counseling / coordination of care: treatment team and patient

## 2021-09-12 NOTE — NURSING NOTE
Pt with hx of DVTs c/o LLE pain with onset this AM upon awakening  7/10 pain, worsens with ambulation  No redness or swelling upon assessment  SLIM notified

## 2021-09-12 NOTE — NURSING NOTE
Patient remained isolative to room throughout the evening  Refused scheduled HS medication -depakote 250mg, stating "I'm sleeping " Denied any unmet needs  Will monitor

## 2021-09-13 ENCOUNTER — APPOINTMENT (INPATIENT)
Dept: NON INVASIVE DIAGNOSTICS | Facility: HOSPITAL | Age: 41
DRG: 750 | End: 2021-09-13
Payer: COMMERCIAL

## 2021-09-13 LAB — VALPROATE SERPL-MCNC: 35.3 UG/ML (ref 50–120)

## 2021-09-13 PROCEDURE — 80164 ASSAY DIPROPYLACETIC ACD TOT: CPT

## 2021-09-13 PROCEDURE — 99232 SBSQ HOSP IP/OBS MODERATE 35: CPT | Performed by: PHYSICIAN ASSISTANT

## 2021-09-13 PROCEDURE — 99232 SBSQ HOSP IP/OBS MODERATE 35: CPT | Performed by: STUDENT IN AN ORGANIZED HEALTH CARE EDUCATION/TRAINING PROGRAM

## 2021-09-13 PROCEDURE — 93971 EXTREMITY STUDY: CPT | Performed by: SURGERY

## 2021-09-13 PROCEDURE — 93971 EXTREMITY STUDY: CPT

## 2021-09-13 RX ORDER — HYDROXYZINE 50 MG/1
50 TABLET, FILM COATED ORAL EVERY 8 HOURS PRN
Status: DISCONTINUED | OUTPATIENT
Start: 2021-09-13 | End: 2021-09-14 | Stop reason: HOSPADM

## 2021-09-13 RX ORDER — HYDROXYZINE HYDROCHLORIDE 10 MG/1
10 TABLET, FILM COATED ORAL EVERY 8 HOURS PRN
Status: DISCONTINUED | OUTPATIENT
Start: 2021-09-13 | End: 2021-09-14 | Stop reason: HOSPADM

## 2021-09-13 RX ORDER — DIVALPROEX SODIUM 500 MG/1
500 TABLET, DELAYED RELEASE ORAL ONCE
Status: COMPLETED | OUTPATIENT
Start: 2021-09-13 | End: 2021-09-13

## 2021-09-13 RX ORDER — DIVALPROEX SODIUM 500 MG/1
500 TABLET, EXTENDED RELEASE ORAL 2 TIMES DAILY
Status: DISCONTINUED | OUTPATIENT
Start: 2021-09-13 | End: 2021-09-14 | Stop reason: HOSPADM

## 2021-09-13 RX ORDER — HYDROXYZINE HYDROCHLORIDE 25 MG/1
25 TABLET, FILM COATED ORAL EVERY 8 HOURS PRN
Status: DISCONTINUED | OUTPATIENT
Start: 2021-09-13 | End: 2021-09-14 | Stop reason: HOSPADM

## 2021-09-13 RX ADMIN — DIVALPROEX SODIUM 500 MG: 500 TABLET, EXTENDED RELEASE ORAL at 21:18

## 2021-09-13 RX ADMIN — NICOTINE POLACRILEX 2 MG: 2 GUM, CHEWING ORAL at 08:30

## 2021-09-13 RX ADMIN — DIVALPROEX SODIUM 250 MG: 250 TABLET, EXTENDED RELEASE ORAL at 08:30

## 2021-09-13 RX ADMIN — RIVAROXABAN 20 MG: 20 TABLET, FILM COATED ORAL at 16:27

## 2021-09-13 RX ADMIN — DIVALPROEX SODIUM 500 MG: 500 TABLET, DELAYED RELEASE ORAL at 12:02

## 2021-09-13 RX ADMIN — ARIPIPRAZOLE 10 MG: 10 TABLET ORAL at 08:30

## 2021-09-13 RX ADMIN — NICOTINE POLACRILEX 2 MG: 2 GUM, CHEWING ORAL at 16:47

## 2021-09-13 RX ADMIN — HYDROXYZINE HYDROCHLORIDE 50 MG: 50 TABLET, FILM COATED ORAL at 16:47

## 2021-09-13 NOTE — PROGRESS NOTES
09/13/21 4240   Team Meeting   Meeting Type Daily Rounds   Team Members Present   Team Members Present Physician;Nurse;   Physician Team Member Dr Abby Jonas Team Member [id number] Management Team Member [place]   Patient/Family Present   Patient Present No   Patient's Family Present No   Pt medication compliant  Pt was in the bathroom with another female peer over the weekend  Pt denies symptoms  Pt signed 72 hour notice that expires tomorrow, discharge pending for tomorrow

## 2021-09-13 NOTE — NURSING NOTE
Pt denies SI, HI, AH's and VH's  Pt started his morning requesting information about discharge from staff, pt informed that he would have to wait to speak with the physician  Pt became more and more agitated, stating that he called his family to come pick him up and that his "outside doctor" would write him a script to get out  A walk through with security was requested to stand by while it was explained that he would not be leaving today  After speaking with staff, pt became visibly calmer and eventually rejoined peers in common areas  He has been compliant and cooperative with staff since this morning  Pt denies any needs at this time

## 2021-09-13 NOTE — PLAN OF CARE
Problem: SUBSTANCE USE/ABUSE  Goal: Will have no detox symptoms and will verbalize plan for changing substance-related behavior  Description: INTERVENTIONS:  - Monitor physical status and assess for symptoms of withdrawal  - Administer medication as ordered  - Provide emotional support with 1 on 1 interaction with staff  - Encourage recovery focused program/ addiction education  - Assess for verbalization of changing behaviors related to substance abuse  - Initiate consults and referrals as appropriate (Case Management, Spiritual Care, etc )  Outcome: Progressing  Goal: By discharge, will develop insight into their chemical dependency and sustain motivation to continue in recovery  Description: INTERVENTIONS:  - Attends all daily group sessions and scheduled AA groups  - Actively practices coping skills through participation in the therapeutic community and adherence to program rules  - Reviews and completes assignments from individual treatment plan  - Assist patient development of understanding of their personal cycle of addiction and relapse triggers  9/13/2021 1427 by Beth Molina RN  Outcome: Progressing  9/13/2021 1425 by Beth Molina RN  Outcome: Not Progressing     Problem: DISCHARGE PLANNING  Goal: Discharge to home or other facility with appropriate resources  Description: INTERVENTIONS:  - Identify barriers to discharge w/patient and caregiver  - Arrange for needed discharge resources and transportation as appropriate  - Identify discharge learning needs (meds, wound care, etc )  - Arrange for interpretive services to assist at discharge as needed  - Refer to Case Management Department for coordinating discharge planning if the patient needs post-hospital services based on physician/advanced practitioner order or complex needs related to functional status, cognitive ability, or social support system  Outcome: Progressing     Problem: Risk for Self Injury/Neglect  Goal: Treatment Goal: Remain safe during length of stay, learn and adopt new coping skills, and be free of self-injurious ideation, impulses and acts at the time of discharge  Outcome: Progressing  Goal: Verbalize thoughts and feelings  Description: Interventions:  - Assess and re-assess patient's lethality and potential for self-injury  - Engage patient in 1:1 interactions, daily, for a minimum of 15 minutes  - Encourage patient to express feelings, fears, frustrations, hopes  - Establish rapport/trust with patient   Outcome: Progressing  Goal: Refrain from harming self  Description: Interventions:  - Monitor patient closely, per order  - Develop a trusting relationship  - Supervise medication ingestion, monitor effects and side effects   Outcome: Progressing  Goal: Attend and participate in unit activities, including therapeutic, recreational, and educational groups  Description: Interventions:  - Provide therapeutic and educational activities daily, encourage attendance and participation, and document same in the medical record  - Obtain collateral information, encourage visitation and family involvement in care   Outcome: Progressing     Problem: Ineffective Coping  Goal: Participates in unit activities  Description: Interventions:  - Provide therapeutic environment   - Provide required programming   - Redirect inappropriate behaviors   Outcome: Progressing

## 2021-09-13 NOTE — PROGRESS NOTES
Patient with evidence of chronic deep venous thrombosis on venous duplex of left lower extremity  Has been on Xarelto 20 mg PO daily  Unclear if DVT demonstrates new thrombosis and Xarelto failure vs chronic DVT  Will switch anticoagulation to Eliquis 10 mg BID x7 days (treatment dose) followed by 5 mg BID thereafter  Patient will need hypercoagulable/carcinoma workup in the outpatient setting if it has not already been performed

## 2021-09-13 NOTE — ASSESSMENT & PLAN NOTE
· Patient reports having "seizure disorder" since age 16 years, which have been diagnosed as "related to my depression"  Patient describes these events as becoming "rigid and stiff" all over and not being able to speak  He is awake during the events and recalls these events  He denies any tongue biting or bowel/bladder incontinence  Events appear to be psychogenic in nature rather than epileptic  · Depakote level is subtherapeutic 35 3  Recommend giving a 1 time dose of 500 mg now then continue Depakote 250 mg BID    · Seizure precautions  · Recommend f/u PCP and neurology outpatient for ongoing monitoring/maintenance

## 2021-09-13 NOTE — NURSING NOTE
Patient remained isolative to room throughout the evening  Easy to wake for HS medication  Compliant  Denied any unmet needs  Scant in conversation  Will monitor

## 2021-09-13 NOTE — NURSING NOTE
Pt denies SI, HI, AH's, and VH's  Pt had a brief incident over being upset that he had to turn in the tablet that he was using  Pt was angry with the staff who asked for it back  Staff reinforced the unit rules and encouraged him to make better decisions in the future  Pt has been calm and cooperative for the rest of shift  Pt is social with peers  Pt approached nurse to report feeling anxious  Pt requested "something before things get out of hand "    Administered PRN 50mg Atarax PO at 1647 for severe anxiety  Pt states "I don't know what's wrong with me, I'm so sad and anxious   I'm just going to go to my room and chill out "

## 2021-09-13 NOTE — ASSESSMENT & PLAN NOTE
· Patient reports having "seizure disorder" since age 16 years, which have been diagnosed as "related to my depression"  Patient describes these events as becoming "rigid and stiff" all over and not being able to speak  He is awake during the events and recalls these events  He denies any tongue biting or bowel/bladder incontinence  Events appear to be psychogenic in nature rather than epileptic  · Depakote level is subtherapeutic 35 3  Recommend giving a 1 time dose of 500 mg now then continue Depakote 250 mg BID    Recheck VPA level in the AM   · Seizure precautions  · Recommend f/u PCP and neurology outpatient for ongoing monitoring/maintenance

## 2021-09-13 NOTE — PROGRESS NOTES
Progress Note - Pilar [age] y o  male MRN: [id number]  Unit/Bed#: [facility] 347-01 Encounter: [id number]    Assessment/Plan   Principal Problem:    Schizoaffective disorder ([place] [address] )  Active Problems:    Medical clearance for psychiatric admission    Seizure disorder ([facility] )    History of DVT in adulthood    Tobacco dependence due to cigarettes    Substance abuse ([place] [address] )    Acute hip pain, left    Mild intermittent asthma without complication    Pain of left calf    Patient has a 72 hour notice expiring tomorrow, will reassess tomorrow morning if patient will need a longer stay due to safety concerns  Will attempt to continue gathering collateral information  Recommended Treatment:   - Medications  · Continue  · Abilify 10mg due to previous effectiveness  · Xarelto 20mg QH for hx of DVT  · Increase  · Depakote  BID -> 500 BID for hx of seizures and to help manage behavior  - Last Valproic level drawn today: 35 3  - PRN Atarax added for anxiety  - VAS left calf due to calf pain with hx of DVT   - outpatient referral to neurology for seizure hx  - signed 72 hour on 9/11 @1436    Continue with group therapy, milieu therapy and occupational therapy  Continue frequent safety checks and vitals per unit protocol  Case discussed with treatment team   Risks, benefits and possible side effects of Medications: Risks, benefits, and possible side effects of medications have been explained to the patient, who verbalizes understanding    ------------------------------------------------------------    Subjective: Per nursing report, Siva Negrete has been behaviorally cooperative on the unit and meal compliant and mostly med compliant  He missed a dose of Depakote ER on Saturday because he was sleeping  He requested PRN propanolol 10mg for restlessness which was effective  He had a bout of vomiting after drinking orange juice at snack time but denied nausea afterwards   Staff noted that a peer was found in his bathroom briefly  Pt had left calf pain with hx of DVT, VAS was ordered  This morning patient reported having a seizure  Per medical team, possible psychogenic rather than epileptic seizure  Recommended one time increase in Depakote  Today, Tamar Bahena is consenting for safety on the unit  He reports wanting to leave now  He states he has family awaiting him outside that came from [facility] to the hospital ready to pick him up  He is agreeable for team to speak with his uncle and provides the phone number: [phone] Zechariah Singh  He notes his wife is leaving to [facility] 3 days and 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  He threatens to destroy property and break down the doors if he is not discharged today  The remainder of the interview was concluded for safety reasons  Security was requested for a walk through and was able to deescalate patient's agitation  Upon second interview with Isaiah, he apologizes for his earlier poor behavior  He is smiling and joking with me  He notes that his attempt to hang himself occurred 1 month prior when his uncle, Kiki, found and stopped him  He states for this admission he was feeling depressed and had thoughts of suicide without any attempts  He notes his sister threatened to call the police on him so he decided to sign himself in  He states he is not speaking with his sister at this time and only wants his uncle involved in his care  He requests something for his anxiety  He believes that he is ready for discharge at this instant and would not like to stay any longer than tomorrow; however, he understands that his stay can be extended if there are safety concerns  He is agreeable to Depakote increase  I personally spoke with the Zechariah Singh - pt's uncle  Kiki was unaware that the patient was hospitalized  He is unaware of any patient's prior psychiatric hospitalizations or diagnoses    He states that he was going to  the patient from the hospital and the patient would be staying with him because of frequent arguments at home with patient's wife  The uncle states that the patient has no problems with his brother and looks up to him as a person  The uncle expresses that despite occasional poor behavior, he has no concerns for suicidal or homicidal actions or plans  He is agreeable for discharge when doctors team appropriate, whenever that timeline is      Progress Toward Goals:  Minor regression - as patient was making threats on the unit    Psychiatric Review of Systems:  Behavior over the last 24 hours: regressed  Sleep: Unable to access due to noncompliance with interview  Appetite: Unable to access due to noncompliance with interview  Medication side effects: Unable to access due to noncompliance with interview though none verbalized  ROS: Unable to access due to noncompliance with interview though none verbalized    Vital signs in last 24 hours:  Temp:  [96 8 °F (36 °C)-98 6 °F (37 °C)] 98 6 °F (37 °C)  HR:  [72-92] 72  Resp:  [16] 16  BP: (103-114)/(54-68) 108/54    Mental Status Exam:  Appearance:  alert, good eye contact, appears stated age, casually dressed, appropriate grooming and hygiene, tattooed and Recently buzzed hair   Behavior:  limited cooperativity and evasive   Motor: no abnormal movements and normal gait and balance   Speech:  spontaneous, normal rate and not pressured   Mood:  angry   Affect:  labile-joking and smiling at times though easily upset and agitated   Thought Process:  organized, goal directed, perseverative on leaving   Thought Content: no verbalized delusions or overt paranoia   Perceptual disturbances: no reported hallucinations and does not appear to be responding to internal stimuli at this time   Risk Potential: No active or passive suicidal or homicidal ideation was verbalized during interview, patient denies any suicidal thoughts since admission   Cognition: oriented to self and situation, appears to be of average intelligence and cognition not formally tested   Insight:  Limited   Judgment: Poor     Current Medications:  Current Facility-Administered Medications   Medication Dose Route Frequency Provider Last Rate    acetaminophen  650 mg Oral Q6H PRN Linda Damico PA-C      acetaminophen  975 mg Oral Q8H PRN Linda Damico PA-C      albuterol  2 puff Inhalation Q4H PRN Linda Damico PA-C      ARIPiprazole  10 mg Oral Daily Marissa Pierer MD      benztropine  1 mg Intramuscular Q4H PRN Max 6/day Marissa Pierre MD      benztropine  1 mg Oral Q4H PRN Max 6/day Marissa Pierre MD      divalproex sodium  250 mg Oral BID Brittany Trent DO      nicotine polacrilex  2 mg Oral Q2H PRN Marissa Pierre MD      OLANZapine  10 mg Oral Q3H PRN Max 3/day Marissa Pierre MD      Or    OLANZapine  10 mg Intramuscular Q3H PRN Max 3/day Marissa Pierre MD      OLANZapine  5 mg Oral Q3H PRN Max 6/day Marissa Pierre MD      Or    OLANZapine  5 mg Intramuscular Q3H PRN Max 6/day Marissa Pierre MD      OLANZapine  2 5 mg Oral Q3H PRN Max 8/day Marissa Pierre MD      propranolol  10 mg Oral Q8H PRN Marissa Pierre MD      rivaroxaban  20 mg Oral Daily With Brad Soni MD         Behavioral Health Medications: all current active meds have been reviewed  Changes as in plan section above  Laboratory results:  I have personally reviewed all pertinent laboratory/tests results    Recent Results (from the past 48 hour(s))   Valproic acid level, total    Collection Time: 09/13/21  6:53 AM   Result Value Ref Range    Valproic Acid, Total 35 3 (L) 50 - 120 ug/mL

## 2021-09-13 NOTE — PROGRESS NOTES
[facility]  Progress Note - Chirag Carter 1980, 39 y o  male MRN: [id number]  Unit/Bed#: [facility] 347-01 Encounter: [id number]  Primary Care Provider: Tobin Rawls   Date and time admitted to hospital: 2021  3:47 AM    Seizure disorder [facility])  Assessment & Plan  · Patient reports having "seizure disorder" since age 16 years, which have been diagnosed as "related to my depression"  Patient describes these events as becoming "rigid and stiff" all over and not being able to speak  He is awake during the events and recalls these events  He denies any tongue biting or bowel/bladder incontinence  Events appear to be psychogenic in nature rather than epileptic  · Depakote level is subtherapeutic 35 3  Recommend giving a 1 time dose of 500 mg now then continue Depakote 250 mg BID  Recheck VPA level in the AM   · Seizure precautions  · Recommend f/u PCP and neurology outpatient for ongoing monitoring/maintenance       Nurse Coordination of Care Discussion: Discussed with patient's primary nurse    Discussions with Specialists or Other Care Team Provider: None    Education and Discussions with Family / Patient: All patient questions answered to the best of my ability  Time Spent for Care: 30 minutes  More than 50% of total time spent on counseling and coordination of care as described above  Current Length of Stay: 4 day(s)    Code Status: Level 1 - Full Code      Subjective:   Patient reports having had "a seizure" this AM, which he describes as becoming stiff all over and not being able to speak  He reports these events are usually triggered by his mood  He reports being awake during the entire event and denies any tongue biting or bowel/bladder incontinence  He reports he started having "seizures" at the age of 16 and reports they are "related to his mood"  He takes Depakote for both seizures and mood      Objective:     Vitals:   Temp (24hrs), Av 9 °F (36 6 °C), Min:97 1 °F (36 2 °C), Max:98 6 °F (37 °C)    Temp:  [97 1 °F (36 2 °C)-98 6 °F (37 °C)] 98 6 °F (37 °C)  HR:  [72-82] 72  Resp:  [16] 16  BP: (108-114)/(54-68) 108/54  SpO2:  [100 %] 100 %  Body mass index is 34 03 kg/m²  Physical Exam:     Physical Exam  Vitals reviewed  HENT:      Head: Normocephalic and atraumatic  Eyes:      General: No scleral icterus  Cardiovascular:      Rate and Rhythm: Normal rate and regular rhythm  Heart sounds: No murmur heard  Pulmonary:      Breath sounds: Normal breath sounds  No wheezing or rales  Chest:      Chest wall: No tenderness  Abdominal:      General: Bowel sounds are normal  There is no distension  Palpations: Abdomen is soft  Tenderness: There is no abdominal tenderness  Musculoskeletal:         General: Normal range of motion  Skin:     General: Skin is warm and dry  Findings: No rash  Additional Data:     Labs:  * I Have Reviewed All Lab Data Listed Above  * Additional Pertinent Lab Tests Reviewed:  All Labs Within Last 24 Hours Reviewed    Imaging:    Imaging Reports Reviewed Today Include: None      Last 24 Hours Medication List:   Current Facility-Administered Medications   Medication Dose Route Frequency Provider Last Rate    acetaminophen  650 mg Oral Q6H PRN Linda Defrancisco, PA-C      acetaminophen  975 mg Oral Q8H PRN Linda Defrancisco, PA-C      albuterol  2 puff Inhalation Q4H PRN Linda Defrancisco, PA-C      ARIPiprazole  10 mg Oral Daily Maegan Martel MD      benztropine  1 mg Intramuscular Q4H PRN Max 6/day Maegan Martel MD      benztropine  1 mg Oral Q4H PRN Max 6/day Maegan Martel MD      divalproex sodium  250 mg Oral BID Wilma Beach DO      nicotine polacrilex  2 mg Oral Q2H PRN Maegan Martel MD      OLANZapine  10 mg Oral Q3H PRN Max 3/day Maegan Martel MD      Or    OLANZapine  10 mg Intramuscular Q3H PRN Max 3/day Maegan Martel MD      OLANZapine  5 mg Oral Q3H PRN Max 6/day Ross Stone MD      Or    OLANZapine  5 mg Intramuscular Q3H PRN Max 6/day Ross Stone MD      OLANZapine  2 5 mg Oral Q3H PRN Max 8/day Ross Stone MD      propranolol  10 mg Oral Q8H PRN Ross Stone MD      rivaroxaban  20 mg Oral Daily With Lb Fry MD          Today, Patient Was Seen By: Joshua Tyler PA-C      ** Please Note: Dictation voice to text software may have been used in the creation of this document   **

## 2021-09-14 ENCOUNTER — TELEPHONE (OUTPATIENT)
Dept: FAMILY MEDICINE CLINIC | Facility: CLINIC | Age: 41
End: 2021-09-14

## 2021-09-14 VITALS
TEMPERATURE: 98.5 F | OXYGEN SATURATION: 100 % | DIASTOLIC BLOOD PRESSURE: 62 MMHG | SYSTOLIC BLOOD PRESSURE: 110 MMHG | HEART RATE: 67 BPM | HEIGHT: 71 IN | RESPIRATION RATE: 16 BRPM | BODY MASS INDEX: 34.16 KG/M2 | WEIGHT: 244 LBS

## 2021-09-14 PROBLEM — Z00.8 MEDICAL CLEARANCE FOR PSYCHIATRIC ADMISSION: Status: RESOLVED | Noted: 2021-09-09 | Resolved: 2021-09-14

## 2021-09-14 PROCEDURE — 99238 HOSP IP/OBS DSCHRG MGMT 30/<: CPT | Performed by: STUDENT IN AN ORGANIZED HEALTH CARE EDUCATION/TRAINING PROGRAM

## 2021-09-14 RX ORDER — ARIPIPRAZOLE 10 MG/1
10 TABLET ORAL DAILY
Qty: 30 TABLET | Refills: 0 | Status: SHIPPED | OUTPATIENT
Start: 2021-09-15 | End: 2021-09-14

## 2021-09-14 RX ORDER — HYDROXYZINE 50 MG/1
50 TABLET, FILM COATED ORAL EVERY 8 HOURS PRN
Qty: 20 TABLET | Refills: 1 | Status: SHIPPED | OUTPATIENT
Start: 2021-09-14 | End: 2021-09-14

## 2021-09-14 RX ORDER — HYDROXYZINE 50 MG/1
50 TABLET, FILM COATED ORAL EVERY 8 HOURS PRN
Qty: 20 TABLET | Refills: 1 | Status: SHIPPED | OUTPATIENT
Start: 2021-09-14

## 2021-09-14 RX ORDER — DIVALPROEX SODIUM 500 MG/1
500 TABLET, EXTENDED RELEASE ORAL 2 TIMES DAILY
Qty: 60 TABLET | Refills: 1 | Status: SHIPPED | OUTPATIENT
Start: 2021-09-14

## 2021-09-14 RX ORDER — DIVALPROEX SODIUM 500 MG/1
500 TABLET, EXTENDED RELEASE ORAL 2 TIMES DAILY
Qty: 60 TABLET | Refills: 0 | Status: SHIPPED | OUTPATIENT
Start: 2021-09-14 | End: 2021-09-14 | Stop reason: SDUPTHER

## 2021-09-14 RX ORDER — ARIPIPRAZOLE 10 MG/1
10 TABLET ORAL DAILY
Qty: 30 TABLET | Refills: 1 | Status: SHIPPED | OUTPATIENT
Start: 2021-09-15

## 2021-09-14 RX ADMIN — APIXABAN 10 MG: 5 TABLET, FILM COATED ORAL at 09:13

## 2021-09-14 RX ADMIN — NICOTINE POLACRILEX 2 MG: 2 GUM, CHEWING ORAL at 12:14

## 2021-09-14 RX ADMIN — ARIPIPRAZOLE 10 MG: 10 TABLET ORAL at 09:13

## 2021-09-14 RX ADMIN — DIVALPROEX SODIUM 500 MG: 500 TABLET, EXTENDED RELEASE ORAL at 09:13

## 2021-09-14 NOTE — PLAN OF CARE
Problem: SUBSTANCE USE/ABUSE  Goal: Will have no detox symptoms and will verbalize plan for changing substance-related behavior  Description: INTERVENTIONS:  - Monitor physical status and assess for symptoms of withdrawal  - Administer medication as ordered  - Provide emotional support with 1 on 1 interaction with staff  - Encourage recovery focused program/ addiction education  - Assess for verbalization of changing behaviors related to substance abuse  - Initiate consults and referrals as appropriate (Case Management, Spiritual Care, etc )  Outcome: Completed  Goal: By discharge, will develop insight into their chemical dependency and sustain motivation to continue in recovery  Description: INTERVENTIONS:  - Attends all daily group sessions and scheduled AA groups  - Actively practices coping skills through participation in the therapeutic community and adherence to program rules  - Reviews and completes assignments from individual treatment plan  - Assist patient development of understanding of their personal cycle of addiction and relapse triggers  Outcome: Completed     Problem: DISCHARGE PLANNING  Goal: Discharge to home or other facility with appropriate resources  Description: INTERVENTIONS:  - Identify barriers to discharge w/patient and caregiver  - Arrange for needed discharge resources and transportation as appropriate  - Identify discharge learning needs (meds, wound care, etc )  - Arrange for interpretive services to assist at discharge as needed  - Refer to Case Management Department for coordinating discharge planning if the patient needs post-hospital services based on physician/advanced practitioner order or complex needs related to functional status, cognitive ability, or social support system  Outcome: Completed     Problem: Risk for Self Injury/Neglect  Goal: Treatment Goal: Remain safe during length of stay, learn and adopt new coping skills, and be free of self-injurious ideation, impulses and acts at the time of discharge  Outcome: Completed  Goal: Verbalize thoughts and feelings  Description: Interventions:  - Assess and re-assess patient's lethality and potential for self-injury  - Engage patient in 1:1 interactions, daily, for a minimum of 15 minutes  - Encourage patient to express feelings, fears, frustrations, hopes  - Establish rapport/trust with patient   Outcome: Completed  Goal: Refrain from harming self  Description: Interventions:  - Monitor patient closely, per order  - Develop a trusting relationship  - Supervise medication ingestion, monitor effects and side effects   Outcome: Completed  Goal: Attend and participate in unit activities, including therapeutic, recreational, and educational groups  Description: Interventions:  - Provide therapeutic and educational activities daily, encourage attendance and participation, and document same in the medical record  - Obtain collateral information, encourage visitation and family involvement in care   Outcome: Completed     Problem: Ineffective Coping  Goal: Participates in unit activities  Description: Interventions:  - Provide therapeutic environment   - Provide required programming   - Redirect inappropriate behaviors   Outcome: Completed

## 2021-09-14 NOTE — TREATMENT TEAM
09/14/21 0909   Team Meeting   Meeting Type Daily Rounds   Team Members Present   Team Members Present Physician;   Physician Team Member [facility]   Nursing Team Member [facility] Management Team Member Ananya Juarez   Other (Discipline and Name)    Patient/Family Present   Patient Present No   Patient's Family Present No     Pt agitated, demanding, and irritable  Pt compliant with meds and meals  Pt was given prn atarax for severe anxiety  72 hour notice up today at 1436; encourage pt to rescind and determine d/c Vs pursuing [facility]  Continue to monitor

## 2021-09-14 NOTE — TELEPHONE ENCOUNTER
Shana from [facility] left a voicemail requesting appt for  New pt  I called the pt and the person who answered the phone states was a wrong#  I called shana and carolyn

## 2021-09-14 NOTE — CASE MANAGEMENT
Met with patient for discharge planning; reviewed and discussed effective use of learned Coping skills, support system such as [address], Calling 911 or going to nearest emergency for true emergency  Pt was informed about picking up his discharge meds from the [address] [place] [address]  Pt's Brother-in-Law also informed and agreed to assist patient picks up his meds  Reviewed patient's follow up appointments at [address]  [facility] and PCP Dr Alexei Barrera  Discussed these appointment being new one and encouraged patient to comply with scheduled appointment  Pt verbalized understanding of the plan  Pt denied SI, HI and AVH  D/c today at 1:30 with Brother in [place]

## 2021-09-14 NOTE — CASE MANAGEMENT
Pt signed TYRELL for [place] [facility] were called at [phone]:    Staff advised that patient has not been seen by them over a year and will need to schedule a Intake appointment  Staff scheduled patient's Intake appointment on Thursday, 9/23/21 at 12:00 PM  This is added to AVS      AVS to be faxed to the provider

## 2021-09-14 NOTE — BH TRANSITION RECORD
Contact Information: If you have any questions, concerns, pended studies, tests and/or procedures, or emergencies regarding your inpatient behavioral health visit  Please contact [address] [place] [address] behavioral health unit 3B [phone] and ask to speak to a , nurse or physician  A contact is available 24 hours/ 7 days a week at this number  Summary of Procedures Performed During your Stay:  Below is a list of major procedures performed during your hospital stay and a summary of results:    9/13/21 VAS lower limb  Impression:  RIGHT LOWER LIMB LIMITED:  Evaluation shows no evidence of thrombus in the common femoral vein  Doppler evaluation shows a normal response to augmentation maneuvers  LEFT LOWER LIMB:  Evidence of non occlusive, chronic deep vein thrombosis identified in the  peroneal veins  No evidence of superficial thrombophlebitis noted  Doppler evaluation shows a normal response to augmentation maneuvers  Popliteal, posterior tibial and anterior tibial arterial Doppler waveforms are  triphasic  9/10/21 XR Left Hip   IMPRESSION:  No acute osseous abnormality  If studies are pending at discharge, follow up with your PCP and/or referring provider
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
## 2021-09-14 NOTE — CASE MANAGEMENT
Pt's brother-in-law Bren Vick [phone] was called and provided status update  Kiki stated "I talked to Isaiah and he seem good  Actually, he can come and stay with me at home (at [address] [facility], [place]) after done with hospital"  Jonathan Pickett declined any concerns about patient at this time  Discharge plan was discussed -scheduling follow up PCP and MHOP  Discussed importance of calling [facility] and local [facility] if patient plans to stay longer in [facility]  As patient has MA through [facility]  Kiki verbalized the understanding and willing to support patient  Continue to monitor

## 2021-09-14 NOTE — NURSING NOTE
Patient denies HI, SI, AH's and VH's  Patient is pleasant and cooperative with staff  Patient is social with peers  Patient states that he is looking forward to discharge  Pt denies any needs at this time

## 2021-09-14 NOTE — NURSING NOTE
Reviewed AVS, medications, and tobacco cessation paperwork with patient  Patient expressed verbal understanding  Patient left with belongings and blue folder containing scripts and discharge instructions  Patient denied all symptoms  Patient looking forward to discharge
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
